# Patient Record
Sex: FEMALE | Race: WHITE | Employment: OTHER | URBAN - METROPOLITAN AREA
[De-identification: names, ages, dates, MRNs, and addresses within clinical notes are randomized per-mention and may not be internally consistent; named-entity substitution may affect disease eponyms.]

---

## 2017-05-02 ENCOUNTER — APPOINTMENT (OUTPATIENT)
Dept: GENERAL RADIOLOGY | Age: 82
DRG: 880 | End: 2017-05-02
Attending: INTERNAL MEDICINE
Payer: MEDICARE

## 2017-05-02 ENCOUNTER — HOSPITAL ENCOUNTER (INPATIENT)
Age: 82
LOS: 2 days | Discharge: HOME | DRG: 880 | End: 2017-05-04
Attending: INTERNAL MEDICINE | Admitting: INTERNAL MEDICINE
Payer: MEDICARE

## 2017-05-02 DIAGNOSIS — R32 URINARY INCONTINENCE: ICD-10-CM

## 2017-05-02 PROBLEM — R00.0 TACHYCARDIA: Status: ACTIVE | Noted: 2017-05-02

## 2017-05-02 LAB
ALBUMIN SERPL BCP-MCNC: 3.4 G/DL (ref 3.4–5)
ALBUMIN/GLOB SERPL: 1 {RATIO} (ref 0.8–1.7)
ALP SERPL-CCNC: 141 U/L (ref 45–117)
ALT SERPL-CCNC: 20 U/L (ref 13–56)
ANION GAP BLD CALC-SCNC: 9 MMOL/L (ref 3–18)
AST SERPL W P-5'-P-CCNC: 27 U/L (ref 15–37)
BASOPHILS # BLD AUTO: 0 K/UL (ref 0–0.06)
BASOPHILS # BLD: 1 % (ref 0–2)
BILIRUB SERPL-MCNC: 0.7 MG/DL (ref 0.2–1)
BUN SERPL-MCNC: 11 MG/DL (ref 7–18)
BUN/CREAT SERPL: 10 (ref 12–20)
CALCIUM SERPL-MCNC: 9 MG/DL (ref 8.5–10.1)
CHLORIDE SERPL-SCNC: 89 MMOL/L (ref 100–108)
CK MB CFR SERPL CALC: NORMAL % (ref 0–4)
CK MB SERPL-MCNC: <1 NG/ML (ref 5–25)
CK SERPL-CCNC: 102 U/L (ref 26–192)
CO2 SERPL-SCNC: 35 MMOL/L (ref 21–32)
CREAT SERPL-MCNC: 1.12 MG/DL (ref 0.6–1.3)
DIFFERENTIAL METHOD BLD: ABNORMAL
EOSINOPHIL # BLD: 0.2 K/UL (ref 0–0.4)
EOSINOPHIL NFR BLD: 2 % (ref 0–5)
ERYTHROCYTE [DISTWIDTH] IN BLOOD BY AUTOMATED COUNT: 13.6 % (ref 11.6–14.5)
GLOBULIN SER CALC-MCNC: 3.3 G/DL (ref 2–4)
GLUCOSE SERPL-MCNC: 144 MG/DL (ref 74–99)
HCT VFR BLD AUTO: 37.3 % (ref 35–45)
HGB BLD-MCNC: 12.6 G/DL (ref 12–16)
LYMPHOCYTES # BLD AUTO: 14 % (ref 21–52)
LYMPHOCYTES # BLD: 1.1 K/UL (ref 0.9–3.6)
MCH RBC QN AUTO: 28.9 PG (ref 24–34)
MCHC RBC AUTO-ENTMCNC: 33.8 G/DL (ref 31–37)
MCV RBC AUTO: 85.6 FL (ref 74–97)
MONOCYTES # BLD: 0.6 K/UL (ref 0.05–1.2)
MONOCYTES NFR BLD AUTO: 8 % (ref 3–10)
NEUTS SEG # BLD: 5.9 K/UL (ref 1.8–8)
NEUTS SEG NFR BLD AUTO: 75 % (ref 40–73)
PLATELET # BLD AUTO: 247 K/UL (ref 135–420)
PMV BLD AUTO: 8.1 FL (ref 9.2–11.8)
POTASSIUM SERPL-SCNC: 3.2 MMOL/L (ref 3.5–5.5)
PROT SERPL-MCNC: 6.7 G/DL (ref 6.4–8.2)
RBC # BLD AUTO: 4.36 M/UL (ref 4.2–5.3)
SODIUM SERPL-SCNC: 133 MMOL/L (ref 136–145)
TROPONIN I SERPL-MCNC: 0.02 NG/ML (ref 0–0.04)
TSH SERPL DL<=0.05 MIU/L-ACNC: 2.38 UIU/ML (ref 0.36–3.74)
WBC # BLD AUTO: 7.8 K/UL (ref 4.6–13.2)

## 2017-05-02 PROCEDURE — 84443 ASSAY THYROID STIM HORMONE: CPT | Performed by: INTERNAL MEDICINE

## 2017-05-02 PROCEDURE — 65270000029 HC RM PRIVATE

## 2017-05-02 PROCEDURE — 36415 COLL VENOUS BLD VENIPUNCTURE: CPT | Performed by: INTERNAL MEDICINE

## 2017-05-02 PROCEDURE — 74011250637 HC RX REV CODE- 250/637: Performed by: INTERNAL MEDICINE

## 2017-05-02 PROCEDURE — 82550 ASSAY OF CK (CPK): CPT | Performed by: INTERNAL MEDICINE

## 2017-05-02 PROCEDURE — 93005 ELECTROCARDIOGRAM TRACING: CPT

## 2017-05-02 PROCEDURE — 74011250636 HC RX REV CODE- 250/636: Performed by: INTERNAL MEDICINE

## 2017-05-02 PROCEDURE — 80053 COMPREHEN METABOLIC PANEL: CPT | Performed by: INTERNAL MEDICINE

## 2017-05-02 PROCEDURE — 71010 XR CHEST PORT: CPT

## 2017-05-02 PROCEDURE — 85025 COMPLETE CBC W/AUTO DIFF WBC: CPT | Performed by: INTERNAL MEDICINE

## 2017-05-02 PROCEDURE — 74011000258 HC RX REV CODE- 258: Performed by: INTERNAL MEDICINE

## 2017-05-02 RX ORDER — HEPARIN SODIUM 5000 [USP'U]/ML
5000 INJECTION, SOLUTION INTRAVENOUS; SUBCUTANEOUS EVERY 12 HOURS
Status: DISCONTINUED | OUTPATIENT
Start: 2017-05-02 | End: 2017-05-04 | Stop reason: HOSPADM

## 2017-05-02 RX ORDER — METOPROLOL SUCCINATE 50 MG/1
50 TABLET, EXTENDED RELEASE ORAL DAILY
Status: DISCONTINUED | OUTPATIENT
Start: 2017-05-02 | End: 2017-05-04 | Stop reason: HOSPADM

## 2017-05-02 RX ORDER — ERGOCALCIFEROL 1.25 MG/1
50000 CAPSULE ORAL
Status: DISCONTINUED | OUTPATIENT
Start: 2017-05-02 | End: 2017-05-04 | Stop reason: HOSPADM

## 2017-05-02 RX ORDER — PANTOPRAZOLE SODIUM 40 MG/1
40 TABLET, DELAYED RELEASE ORAL
Status: DISCONTINUED | OUTPATIENT
Start: 2017-05-03 | End: 2017-05-04 | Stop reason: HOSPADM

## 2017-05-02 RX ORDER — POTASSIUM CHLORIDE, DEXTROSE MONOHYDRATE AND SODIUM CHLORIDE 300; 5; 900 MG/100ML; G/100ML; MG/100ML
INJECTION, SOLUTION INTRAVENOUS CONTINUOUS
Status: DISCONTINUED | OUTPATIENT
Start: 2017-05-02 | End: 2017-05-04 | Stop reason: HOSPADM

## 2017-05-02 RX ORDER — ATORVASTATIN CALCIUM 40 MG/1
40 TABLET, FILM COATED ORAL DAILY
Status: DISCONTINUED | OUTPATIENT
Start: 2017-05-03 | End: 2017-05-02

## 2017-05-02 RX ORDER — MIRTAZAPINE 15 MG/1
30 TABLET, FILM COATED ORAL
Status: DISCONTINUED | OUTPATIENT
Start: 2017-05-02 | End: 2017-05-04 | Stop reason: HOSPADM

## 2017-05-02 RX ORDER — LORAZEPAM 0.5 MG/1
0.5 TABLET ORAL 2 TIMES DAILY
Status: DISCONTINUED | OUTPATIENT
Start: 2017-05-02 | End: 2017-05-04 | Stop reason: HOSPADM

## 2017-05-02 RX ORDER — LEVOTHYROXINE SODIUM 100 UG/1
100 TABLET ORAL
Status: DISCONTINUED | OUTPATIENT
Start: 2017-05-03 | End: 2017-05-04 | Stop reason: HOSPADM

## 2017-05-02 RX ORDER — AMLODIPINE BESYLATE 5 MG/1
5 TABLET ORAL DAILY
COMMUNITY

## 2017-05-02 RX ORDER — ATORVASTATIN CALCIUM 20 MG/1
20 TABLET, FILM COATED ORAL DAILY
Status: DISCONTINUED | OUTPATIENT
Start: 2017-05-03 | End: 2017-05-04 | Stop reason: HOSPADM

## 2017-05-02 RX ORDER — LANOLIN ALCOHOL/MO/W.PET/CERES
1000 CREAM (GRAM) TOPICAL DAILY
Status: DISCONTINUED | OUTPATIENT
Start: 2017-05-03 | End: 2017-05-04 | Stop reason: HOSPADM

## 2017-05-02 RX ORDER — GUAIFENESIN 100 MG/5ML
81 LIQUID (ML) ORAL DAILY
Status: DISCONTINUED | OUTPATIENT
Start: 2017-05-03 | End: 2017-05-04 | Stop reason: HOSPADM

## 2017-05-02 RX ORDER — TRAMADOL HYDROCHLORIDE 50 MG/1
50 TABLET ORAL
Status: DISCONTINUED | OUTPATIENT
Start: 2017-05-02 | End: 2017-05-04 | Stop reason: HOSPADM

## 2017-05-02 RX ORDER — MONTELUKAST SODIUM 10 MG/1
10 TABLET ORAL
Status: DISCONTINUED | OUTPATIENT
Start: 2017-05-02 | End: 2017-05-04 | Stop reason: HOSPADM

## 2017-05-02 RX ORDER — DEXAMETHASONE SODIUM PHOSPHATE 4 MG/ML
4 INJECTION, SOLUTION INTRA-ARTICULAR; INTRALESIONAL; INTRAMUSCULAR; INTRAVENOUS; SOFT TISSUE EVERY 8 HOURS
Status: DISCONTINUED | OUTPATIENT
Start: 2017-05-02 | End: 2017-05-04 | Stop reason: HOSPADM

## 2017-05-02 RX ORDER — AMLODIPINE BESYLATE 5 MG/1
5 TABLET ORAL DAILY
Status: DISCONTINUED | OUTPATIENT
Start: 2017-05-03 | End: 2017-05-04 | Stop reason: HOSPADM

## 2017-05-02 RX ORDER — FERROUS SULFATE, DRIED 160(50) MG
1 TABLET, EXTENDED RELEASE ORAL
Status: DISCONTINUED | OUTPATIENT
Start: 2017-05-03 | End: 2017-05-04 | Stop reason: HOSPADM

## 2017-05-02 RX ADMIN — DEXAMETHASONE SODIUM PHOSPHATE 4 MG: 4 INJECTION, SOLUTION INTRAMUSCULAR; INTRAVENOUS at 13:24

## 2017-05-02 RX ADMIN — HEPARIN SODIUM 5000 UNITS: 5000 INJECTION, SOLUTION INTRAVENOUS; SUBCUTANEOUS at 13:24

## 2017-05-02 RX ADMIN — DEXAMETHASONE SODIUM PHOSPHATE 4 MG: 4 INJECTION, SOLUTION INTRAMUSCULAR; INTRAVENOUS at 22:07

## 2017-05-02 RX ADMIN — CEFAZOLIN SODIUM 1 G: 1 INJECTION, POWDER, FOR SOLUTION INTRAMUSCULAR; INTRAVENOUS at 13:25

## 2017-05-02 RX ADMIN — MONTELUKAST SODIUM 10 MG: 10 TABLET, FILM COATED ORAL at 22:07

## 2017-05-02 RX ADMIN — ERGOCALCIFEROL 50000 UNITS: 1.25 CAPSULE ORAL at 13:24

## 2017-05-02 RX ADMIN — TRAMADOL HYDROCHLORIDE 50 MG: 50 TABLET, FILM COATED ORAL at 13:24

## 2017-05-02 RX ADMIN — MIRTAZAPINE 30 MG: 15 TABLET, FILM COATED ORAL at 22:07

## 2017-05-02 RX ADMIN — TRAMADOL HYDROCHLORIDE 50 MG: 50 TABLET, FILM COATED ORAL at 20:06

## 2017-05-02 RX ADMIN — DEXTROSE MONOHYDRATE, SODIUM CHLORIDE, AND POTASSIUM CHLORIDE: 50; 9; 2.98 INJECTION, SOLUTION INTRAVENOUS at 20:05

## 2017-05-02 RX ADMIN — METOPROLOL SUCCINATE 50 MG: 50 TABLET, EXTENDED RELEASE ORAL at 13:24

## 2017-05-02 NOTE — IP AVS SNAPSHOT
Current Discharge Medication List  
  
START taking these medications Dose & Instructions Dispensing Information Comments Morning Noon Evening Bedtime  
 dexamethasone 2 mg tablet Commonly known as:  DECADRON Your last dose was: Your next dose is:    
   
   
 Dose:  2 mg Take 1 Tab by mouth two (2) times daily (with meals) for 3 days. Quantity:  6 Tab Refills:  0  
     
   
   
   
  
 lidocaine 5 % Commonly known as:  Curt Hitch Your last dose was: Your next dose is:    
   
   
 Apply patch to the affected area for 12 hours a day and remove for 12 hours a day. Quantity:  30 Each Refills:  2  
     
   
   
   
  
 metoprolol succinate 50 mg XL tablet Commonly known as:  TOPROL-XL Your last dose was: Your next dose is:    
   
   
 Dose:  50 mg Take 1 Tab by mouth daily. Quantity:  30 Tab Refills:  0  
     
   
   
   
  
 mirtazapine 30 mg tablet Commonly known as:  Orlean Cancel Your last dose was: Your next dose is:    
   
   
 Dose:  30 mg Take 1 Tab by mouth nightly. Quantity:  30 Tab Refills:  0  
     
   
   
   
  
 traMADol 50 mg tablet Commonly known as:  ULTRAM  
   
Your last dose was: Your next dose is:    
   
   
 Dose:  50 mg Take 1 Tab by mouth two (2) times daily as needed. Max Daily Amount: 100 mg. Quantity:  20 Tab Refills:  0 CONTINUE these medications which have CHANGED Dose & Instructions Dispensing Information Comments Morning Noon Evening Bedtime  
 cephALEXin 250 mg capsule Commonly known as:  Mark Maria What changed:  when to take this Your last dose was: Your next dose is:    
   
   
 Dose:  500 mg Take 2 Caps by mouth three (3) times daily. Quantity:  21 Cap Refills:  0 CONTINUE these medications which have NOT CHANGED Dose & Instructions Dispensing Information Comments Morning Noon Evening Bedtime  
 aspirin 81 mg tablet Your last dose was: Your next dose is:    
   
   
 Dose:  81 mg Take 81 mg by mouth. Refills:  0  
     
   
   
   
  
 atorvastatin 20 mg tablet Commonly known as:  LIPITOR Your last dose was: Your next dose is: Take  by mouth daily. Refills:  0  
     
   
   
   
  
 BONIVA 150 mg tablet Generic drug:  ibandronate Your last dose was: Your next dose is:    
   
   
 Dose:  150 mg Take 150 mg by mouth every thirty (30) days. Refills:  0  
     
   
   
   
  
 CALTRATE 600+D PLUS MINERALS 600 mg calcium- 400 unit Tab Generic drug:  Calcium Carbonate-Vit D3-Min Your last dose was: Your next dose is: Take  by mouth. Refills:  0  
     
   
   
   
  
 citalopram 10 mg tablet Commonly known as:  Cherylynn Divine Your last dose was: Your next dose is:    
   
   
 Dose:  10 mg Take 10 mg by mouth daily. Refills:  0  
     
   
   
   
  
 cyanocobalamin 100 mcg tablet Commonly known as:  VITAMIN B12 Your last dose was: Your next dose is:    
   
   
 Dose:  2500 mcg Take 2,500 mcg by mouth daily. Refills:  0  
     
   
   
   
  
 ergocalciferol 50,000 unit capsule Commonly known as:  ERGOCALCIFEROL Your last dose was: Your next dose is:    
   
   
 Dose:  85835 Units Take 50,000 Units by mouth every seven (7) days. Refills:  0  
     
   
   
   
  
 levothyroxine 100 mcg tablet Commonly known as:  SYNTHROID Your last dose was: Your next dose is: Take  by mouth Daily (before breakfast). Refills:  0  
     
   
   
   
  
 montelukast 10 mg tablet Commonly known as:  SINGULAIR Your last dose was: Your next dose is:    
   
   
 Dose:  10 mg Take 10 mg by mouth daily. Refills:  0 NexIUM 40 mg capsule Generic drug:  esomeprazole Your last dose was: Your next dose is:    
   
   
 Dose:  40 mg Take 40 mg by mouth daily. Refills:  0 NORVASC 5 mg tablet Generic drug:  amLODIPine Your last dose was: Your next dose is:    
   
   
 Dose:  5 mg Take 5 mg by mouth daily. Indications: hypertension Refills:  0 STOP taking these medications   
 estradiol 0.01 % (0.1 mg/gram) vaginal cream  
Commonly known as:  ESTRACE  
   
  
 losartan-hydroCHLOROthiazide 50-12.5 mg per tablet Commonly known as:  HYZAAR  
   
  
 metoprolol tartrate 25 mg tablet Commonly known as:  LOPRESSOR Where to Get Your Medications Information on where to get these meds will be given to you by the nurse or doctor. ! Ask your nurse or doctor about these medications  
  cephALEXin 250 mg capsule  
 dexamethasone 2 mg tablet  
 lidocaine 5 %  
 metoprolol succinate 50 mg XL tablet  
 mirtazapine 30 mg tablet  
 traMADol 50 mg tablet

## 2017-05-02 NOTE — IP AVS SNAPSHOT
Mahendra Eldridge 
 
 
 [address] [place] [address] 
[phone] Patient: Julianne Anna MRN: [id number] :10/14/1926 You are allergic to the following Allergen Reactions Olegario-1 Other (comments)  
 novacaine- makes me go crazy in the head Recent Documentation Height Weight Breastfeeding? BMI OB Status Smoking Status 1.575 m 74.4 kg No 30.01 kg/m2 Hysterectomy Never Smoker Emergency Contacts Name Discharge Info Relation Home Work Mobile Rodriguez Boogie DISCHARGE CAREGIVER [3] Spouse [3] [phone] Soledad Nichole DISCHARGE CAREGIVER [3] Daughter [21] [phone] About your hospitalization You were admitted on:  May 2, 2017 You last received care in the:  [facility] You were discharged on: May 4, 2017 Unit phone number:  [phone] Why you were hospitalized Your primary diagnosis was:  Not on File Your diagnoses also included: Tachycardia, Dyspnea, Hypokalemia, Spinal Stenosis Of Lumbar Region, Right Arm Cellulitis, Lymphedema Of Right Arm, Pac (Premature Atrial Contraction) Providers Seen During Your Hospitalizations Provider Role Specialty Primary office phone Miranda Taveras MD Attending Provider Geriatric Medicine [phone] Your Primary Care Physician (PCP) Primary Care Physician Office Phone Office Fax Imelda Seymour [phone] Follow-up Information Follow up With Details Comments Contact Info [place]   [id number] [address] Internal Medicine [facility] [phone] Current Discharge Medication List  
  
START taking these medications Dose & Instructions Dispensing Information Comments Morning Noon Evening Bedtime  
 dexamethasone 2 mg tablet Commonly known as:  DECADRON Your last dose was: Your next dose is:    
   
   
 Dose:  2 mg Take 1 Tab by mouth two (2) times daily (with meals) for 3 days. Quantity:  6 Tab Refills:  0  
     
   
   
   
  
 lidocaine 5 % Commonly known as:  Lukas Barclay Your last dose was: Your next dose is:    
   
   
 Apply patch to the affected area for 12 hours a day and remove for 12 hours a day. Quantity:  30 Each Refills:  2  
     
   
   
   
  
 metoprolol succinate 50 mg XL tablet Commonly known as:  TOPROL-XL Your last dose was: Your next dose is:    
   
   
 Dose:  50 mg Take 1 Tab by mouth daily. Quantity:  30 Tab Refills:  0  
     
   
   
   
  
 mirtazapine 30 mg tablet Commonly known as:  Lurahul Campos Your last dose was: Your next dose is:    
   
   
 Dose:  30 mg Take 1 Tab by mouth nightly. Quantity:  30 Tab Refills:  0  
     
   
   
   
  
 traMADol 50 mg tablet Commonly known as:  ULTRAM  
   
Your last dose was: Your next dose is:    
   
   
 Dose:  50 mg Take 1 Tab by mouth two (2) times daily as needed. Max Daily Amount: 100 mg. Quantity:  20 Tab Refills:  0 CONTINUE these medications which have CHANGED Dose & Instructions Dispensing Information Comments Morning Noon Evening Bedtime  
 cephALEXin 250 mg capsule Commonly known as:  Christina Pluck What changed:  when to take this Your last dose was: Your next dose is:    
   
   
 Dose:  500 mg Take 2 Caps by mouth three (3) times daily. Quantity:  21 Cap Refills:  0 CONTINUE these medications which have NOT CHANGED Dose & Instructions Dispensing Information Comments Morning Noon Evening Bedtime  
 aspirin 81 mg tablet Your last dose was: Your next dose is:    
   
   
 Dose:  81 mg Take 81 mg by mouth. Refills:  0  
     
   
   
   
  
 atorvastatin 20 mg tablet Commonly known as:  LIPITOR Your last dose was: Your next dose is: Take  by mouth daily. Refills:  0  
     
   
   
   
  
 BONIVA 150 mg tablet Generic drug:  ibandronate Your last dose was: Your next dose is:    
   
   
 Dose:  150 mg Take 150 mg by mouth every thirty (30) days. Refills:  0  
     
   
   
   
  
 CALTRATE 600+D PLUS MINERALS 600 mg calcium- 400 unit Tab Generic drug:  Calcium Carbonate-Vit D3-Min Your last dose was: Your next dose is: Take  by mouth. Refills:  0  
     
   
   
   
  
 citalopram 10 mg tablet Commonly known as:  Alisa Jones Your last dose was: Your next dose is:    
   
   
 Dose:  10 mg Take 10 mg by mouth daily. Refills:  0  
     
   
   
   
  
 cyanocobalamin 100 mcg tablet Commonly known as:  VITAMIN B12 Your last dose was: Your next dose is:    
   
   
 Dose:  2500 mcg Take 2,500 mcg by mouth daily. Refills:  0  
     
   
   
   
  
 ergocalciferol 50,000 unit capsule Commonly known as:  ERGOCALCIFEROL Your last dose was: Your next dose is:    
   
   
 Dose:  47888 Units Take 50,000 Units by mouth every seven (7) days. Refills:  0  
     
   
   
   
  
 levothyroxine 100 mcg tablet Commonly known as:  SYNTHROID Your last dose was: Your next dose is: Take  by mouth Daily (before breakfast). Refills:  0  
     
   
   
   
  
 montelukast 10 mg tablet Commonly known as:  SINGULAIR Your last dose was: Your next dose is:    
   
   
 Dose:  10 mg Take 10 mg by mouth daily. Refills:  0 NexIUM 40 mg capsule Generic drug:  esomeprazole Your last dose was: Your next dose is:    
   
   
 Dose:  40 mg Take 40 mg by mouth daily. Refills:  0 NORVASC 5 mg tablet Generic drug:  amLODIPine Your last dose was: Your next dose is:    
   
   
 Dose:  5 mg Take 5 mg by mouth daily. Indications: hypertension Refills:  0 STOP taking these medications   
 estradiol 0.01 % (0.1 mg/gram) vaginal cream  
Commonly known as:  ESTRACE  
   
  
 losartan-hydroCHLOROthiazide 50-12.5 mg per tablet Commonly known as:  HYZAAR  
   
  
 metoprolol tartrate 25 mg tablet Commonly known as:  LOPRESSOR Where to Get Your Medications Information on where to get these meds will be given to you by the nurse or doctor. ! Ask your nurse or doctor about these medications  
  cephALEXin 250 mg capsule  
 dexamethasone 2 mg tablet  
 lidocaine 5 %  
 metoprolol succinate 50 mg XL tablet  
 mirtazapine 30 mg tablet  
 traMADol 50 mg tablet Discharge Instructions DISCHARGE SUMMARY from Nurse The following personal items are in your possession at time of discharge: 
 
Dental Appliances: None Hearing Aids/Status: Bilateral, With patient Home Medications: None Jewelry: Durene Docker Clothing: At bedside, Footwear, Pants, Shirt, Undergarments, Other (comment) (breast implants (2) inside bra) Other Valuables: [address], [address] home PATIENT INSTRUCTIONS: 
 
 
F-face looks uneven A-arms unable to move or move unevenly S-speech slurred or non-existent T-time-call 911 as soon as signs and symptoms begin-DO NOT go Back to bed or wait to see if you get better-TIME IS BRAIN. Warning Signs of HEART ATTACK Call 911 if you have these symptoms: 
? Chest discomfort. Most heart attacks involve discomfort in the center of the chest that lasts more than a few minutes, or that goes away and comes back. It can feel like uncomfortable pressure, squeezing, fullness, or pain. ? Discomfort in other areas of the upper body. Symptoms can include pain or discomfort in one or both arms, the back, neck, jaw, or stomach. ? Shortness of breath with or without chest discomfort. ? Other signs may include breaking out in a cold sweat, nausea, or lightheadedness. Don't wait more than five minutes to call [address]! Fast action can save your life. Calling 911 is almost always the fastest way to get lifesaving treatment. Emergency Medical Services staff can begin treatment when they arrive  up to an hour sooner than if someone gets to the hospital by car. The discharge information has been reviewed with the patient. The patient verbalized understanding. Discharge medications reviewed with the patient and appropriate educational materials and side effects teaching were provided. Discharge Orders None [employer] Announcement We are excited to announce that we are making your provider's discharge notes available to you in Kirke[employer]. You will see these notes when they are completed and signed by the physician that discharged you from your recent hospital stay. If you have any questions or concerns about any information you see in KirkeWeb, please call the Health Information Department where you were seen or reach out to your Primary Care Provider for more information about your plan of care. Introducing [facility]! Vinita Rajan introduces KirkeWeb patient portal. Now you can access parts of your medical record, email your doctor's office, and request medication refills online. 1. In your internet browser, go to [email or web address] 2. Click on the First Time User? Click Here link in the Sign In box. You will see the New Member Sign Up page. 3. Enter your KirkeWeb Access Code exactly as it appears below. You will not need to use this code after youve completed the sign-up process.  If you do not sign up before the expiration date, you must request a new code. · Down[employer]me Access Code: [email or web address] Expires: 8/1/2017  9:03 AM 
 
4. Enter the last four digits of your Social Security Number (xxxx) and Date of Birth (mm/dd/yyyy) as indicated and click Submit. You will be taken to the next sign-up page. 5. Create a Downtyme ID. This will be your Downtyme login ID and cannot be changed, so think of one that is secure and easy to remember. 6. Create a Downtyme password. You can change your password at any time. 7. Enter your Password Reset Question and Answer. This can be used at a later time if you forget your password. 8. Enter your e-mail address. You will receive e-mail notification when new information is available in [address] 9. Click Sign Up. You can now view and download portions of your medical record. 10. Click the Download Summary menu link to download a portable copy of your medical information. If you have questions, please visit the Frequently Asked Questions section of the Downtyme website. Remember, Downtyme is NOT to be used for urgent needs. For medical emergencies, dial 911. Now available from your iPhone and Android! General Information Please provide this summary of care documentation to your next provider. Patient Signature:  ____________________________________________________________ Date:  ____________________________________________________________  
  
[facility] Provider Signature:  ____________________________________________________________ Date:  ____________________________________________________________

## 2017-05-02 NOTE — ROUTINE PROCESS
1620-Assessment completed, call bell within reach, no distress noted, voiced no complaints at this time, daughter at bedside. 1803-pt resting quietly, refusing scheduled ativan.  1925-Bedside and Verbal shift change report given to Anna GROSSMAN (oncoming nurse) by Nayeli Estes (offgoing nurse). Report included the following information SBAR, MAR and Recent Results.

## 2017-05-02 NOTE — PROGRESS NOTES
conducted an initial consultation and Spiritual Assessment for Lissette Horne, who is a 80 y.o.,female. Patients Primary Language is: [place]. According to the patients EMR Anabaptism Affiliation is: Kwame Klein.     The reason the Patient came to the hospital is:   Patient Active Problem List    Diagnosis Date Noted    Tachycardia 05/02/2017    Urinary incontinence 11/13/2014    Urge incontinence 11/13/2014        The  provided the following Interventions:  Initiated a relationship of care and support. Explored issues of ramírez, belief, spirituality and Yazidi/ritual needs while hospitalized. Listened empathically. Provided chaplaincy education. Provided information about Spiritual Care Services. Offered prayer and assurance of continued prayers on patient's behalf. Chart reviewed. The following outcomes were achieved:  Patient shared limited information about both their medical narrative and spiritual journey/beliefs. Patient processed feeling about current hospitalization. Patient expressed gratitude for the 's visit. Assessment:  Patient does not have any Yazidi/cultural needs that will affect patients preferences in health care. Patient did not indicate any spiritual or Yazidi issues which require Spiritual Care Services interventions at this time. Plan:  Chaplains will continue to follow and will provide pastoral care on an as needed/requested basis.  recommends bedside caregivers page  on duty if patient shows signs of acute spiritual or emotional distress.     [address] [facility]   Staff [facility]   [phone]

## 2017-05-02 NOTE — H&P
[address]    Name:  Mart Severin  MR#:  [id number]  :  10/14/1926  Account #:  [de-identified]  Date of Adm:  2017      CHIEF COMPLAINT: Dyspnea and tachycardia and presyncope. HISTORY OF PRESENT ILLNESS: The patient is a 80-year-old white  female with multiple medical problems, who is being seen in the office  for followup and complaints of worsening low back and left lower  extremity pain. The patient is known to have spinal stenosis for which  epidural injections did not help and patient was not felt to be a good  candidate for surgery. During examination, the patient was noted to be tachycardic with  irregular heart beat. The patient experienced dyspnea during the exam  and her pulse oximetry was in the low 80s for less than a minute. The  patient was put in bed on the exam table and her oxygen saturation as  well as her heart rate improved. At the time, her EKG in the office  showed sinus rhythm with PACs. She denied chest pain or new focal  neurologic symptoms. The patient had a brief presyncopal episode. Her blood pressure remained stable. The patient was felt to need admission for observation and further  management. PAST MEDICAL HISTORY  1. Hypertension. 2.  Hypercholesterolemia. 3.  History of coronary artery disease in the past.  4.  Orthostatic hypotension, currently stable. 5.  Hypothyroidism. 6.  B12 deficiency. 7.  History of iron-deficiency anemia in the past.  8.  History of bronchial asthma. 9.  Gastroesophageal reflux disease. 10. Osteoporosis. 11. Spinal stenosis, as above. 12. Osteoarthritis. 13. The patient also had breast cancer for which she underwent  mastectomy with persistent right upper extremity lymphedema. PAST SURGICAL HISTORY: Right mastectomy as above,  hysterectomy, knee replacement, tonsillectomy, and adenoidectomy. ALLERGIES: SHE IS ALLERGIC TO MORPHINE. CURRENT MEDICATIONS  1.   Aspirin 81 mg daily.  2.  Singulair 10 mg daily. 3.  B12 1000 mcg daily. 4.  Synthroid 100 mcg daily. 5.  Toprol-XL 25 mg daily. 6.  Nexium 40 mg every other day. 7.  Celexa 20 mg daily. 8.  Lipitor 20 mg daily. 9.  Ferrous sulfate 325 mg daily. 10. Remeron 30 mg at bedtime. 11. Ativan 0.5 mg twice daily as needed. SOCIAL HISTORY: The patient is . Her  has dementia. She has 4 children. She does not smoke or drink at this time. FAMILY HISTORY: Positive for coronary artery disease in her parents. REVIEW OF SYSTEMS: The patient, in addition to the above  symptoms, had some slight increase in the swelling and redness of her  right upper extremity. She denied chest pain, no abdominal pain,  nausea, vomiting, diarrhea. No dysuria, polyuria, hematuria. No focal  neurologic symptoms. The rest is as per history of the present illness. PHYSICAL EXAMINATION  GENERAL: Pleasant female in no acute distress, weak and semi-  responsive. during the episode, which lasted a few minutes. HEENT: Atraumatic, normocephalic. Sclerae anicteric. NECK: No adenopathy or thyromegaly. LUNGS: Clear to auscultation. HEART: Irregular, at times and rate up to 121 during exam.  ABDOMEN: Soft, nontender, nondistended. EXTREMITIES: Lower extremity was 1+ edema of the ankle. Right  upper extremity with significant lymphedema and medial cellulitis of the  elbow and forearm. NEUROLOGIC: Nonfocal.    LABORATORIES PENDING: EKG in the office, sinus rhythm with  frequent PACs. IMPRESSION  1. Transient episode of dyspnea, tachycardia, and presyncope,  questionable arrhythmia. 2. Spinal stenosis with sciatica - worse than baseline. 3. Right upper extremity cellulitis and lymphedema. 4. Hypertension. 5. Hypothyroidism. 6. As per past medical history. PLAN: The patient will be admitted to telemetry. Increase Toprol to 50  mg at this time. Will obtain chest x-ray, EKG, baseline labs including  TSH. Resume all her medications. IV Ancef for cellulitis with elevation  of the right arm. IV Decadron for her spinal stenosis. DVT prophylaxis  with subcutaneous heparin. Further management accordingly.         Tiara Kraus MD    NT / TB  D:  05/02/2017   12:41  T:  05/02/2017   13:20  Job #:  [id number]

## 2017-05-02 NOTE — PROGRESS NOTES

## 2017-05-03 PROBLEM — I49.1 PAC (PREMATURE ATRIAL CONTRACTION): Status: ACTIVE | Noted: 2017-05-03

## 2017-05-03 PROBLEM — R06.00 DYSPNEA: Status: ACTIVE | Noted: 2017-05-03

## 2017-05-03 PROBLEM — E87.6 HYPOKALEMIA: Status: ACTIVE | Noted: 2017-05-03

## 2017-05-03 PROBLEM — L03.113 RIGHT ARM CELLULITIS: Status: ACTIVE | Noted: 2017-05-03

## 2017-05-03 PROBLEM — M48.061 SPINAL STENOSIS OF LUMBAR REGION: Status: ACTIVE | Noted: 2017-05-03

## 2017-05-03 PROBLEM — I89.0 LYMPHEDEMA OF RIGHT ARM: Status: ACTIVE | Noted: 2017-05-03

## 2017-05-03 LAB
ANION GAP BLD CALC-SCNC: 10 MMOL/L (ref 3–18)
ATRIAL RATE: 111 BPM
BUN SERPL-MCNC: 12 MG/DL (ref 7–18)
BUN/CREAT SERPL: 11 (ref 12–20)
CALCIUM SERPL-MCNC: 8.6 MG/DL (ref 8.5–10.1)
CALCULATED P AXIS, ECG09: 45 DEGREES
CALCULATED R AXIS, ECG10: 0 DEGREES
CALCULATED T AXIS, ECG11: -133 DEGREES
CHLORIDE SERPL-SCNC: 93 MMOL/L (ref 100–108)
CK MB CFR SERPL CALC: 1.4 % (ref 0–4)
CK MB SERPL-MCNC: 1.4 NG/ML (ref 5–25)
CK SERPL-CCNC: 99 U/L (ref 26–192)
CO2 SERPL-SCNC: 32 MMOL/L (ref 21–32)
CREAT SERPL-MCNC: 1.07 MG/DL (ref 0.6–1.3)
DIAGNOSIS, 93000: NORMAL
GLUCOSE SERPL-MCNC: 157 MG/DL (ref 74–99)
P-R INTERVAL, ECG05: 272 MS
POTASSIUM SERPL-SCNC: 3.3 MMOL/L (ref 3.5–5.5)
Q-T INTERVAL, ECG07: 258 MS
QRS DURATION, ECG06: 84 MS
QTC CALCULATION (BEZET), ECG08: 350 MS
SODIUM SERPL-SCNC: 135 MMOL/L (ref 136–145)
TROPONIN I SERPL-MCNC: 0.02 NG/ML (ref 0–0.04)
VENTRICULAR RATE, ECG03: 111 BPM

## 2017-05-03 PROCEDURE — 97162 PT EVAL MOD COMPLEX 30 MIN: CPT

## 2017-05-03 PROCEDURE — 74011250637 HC RX REV CODE- 250/637: Performed by: INTERNAL MEDICINE

## 2017-05-03 PROCEDURE — 80048 BASIC METABOLIC PNL TOTAL CA: CPT | Performed by: INTERNAL MEDICINE

## 2017-05-03 PROCEDURE — 65270000029 HC RM PRIVATE

## 2017-05-03 PROCEDURE — 74011000258 HC RX REV CODE- 258: Performed by: INTERNAL MEDICINE

## 2017-05-03 PROCEDURE — 74011250636 HC RX REV CODE- 250/636: Performed by: INTERNAL MEDICINE

## 2017-05-03 PROCEDURE — 82550 ASSAY OF CK (CPK): CPT | Performed by: INTERNAL MEDICINE

## 2017-05-03 PROCEDURE — 36415 COLL VENOUS BLD VENIPUNCTURE: CPT | Performed by: INTERNAL MEDICINE

## 2017-05-03 RX ORDER — LIDOCAINE 50 MG/G
1 PATCH TOPICAL EVERY 24 HOURS
Status: DISCONTINUED | OUTPATIENT
Start: 2017-05-03 | End: 2017-05-04 | Stop reason: HOSPADM

## 2017-05-03 RX ADMIN — PANTOPRAZOLE SODIUM 40 MG: 40 TABLET, DELAYED RELEASE ORAL at 08:20

## 2017-05-03 RX ADMIN — CYANOCOBALAMIN TAB 1000 MCG 1000 MCG: 1000 TAB at 08:20

## 2017-05-03 RX ADMIN — TRAMADOL HYDROCHLORIDE 50 MG: 50 TABLET, FILM COATED ORAL at 08:20

## 2017-05-03 RX ADMIN — CEFAZOLIN SODIUM 1 G: 1 INJECTION, POWDER, FOR SOLUTION INTRAMUSCULAR; INTRAVENOUS at 12:15

## 2017-05-03 RX ADMIN — DEXAMETHASONE SODIUM PHOSPHATE 4 MG: 4 INJECTION, SOLUTION INTRAMUSCULAR; INTRAVENOUS at 22:14

## 2017-05-03 RX ADMIN — CALCIUM CARBONATE 500 MG (1,250 MG)-VITAMIN D3 200 UNIT TABLET 1 TABLET: at 08:20

## 2017-05-03 RX ADMIN — ATORVASTATIN CALCIUM 20 MG: 20 TABLET, FILM COATED ORAL at 08:20

## 2017-05-03 RX ADMIN — AMLODIPINE BESYLATE 5 MG: 5 TABLET ORAL at 08:20

## 2017-05-03 RX ADMIN — LEVOTHYROXINE SODIUM 100 MCG: 100 TABLET ORAL at 08:24

## 2017-05-03 RX ADMIN — DEXTROSE MONOHYDRATE, SODIUM CHLORIDE, AND POTASSIUM CHLORIDE: 50; 9; 2.98 INJECTION, SOLUTION INTRAVENOUS at 11:22

## 2017-05-03 RX ADMIN — ASPIRIN 81 MG CHEWABLE TABLET 81 MG: 81 TABLET CHEWABLE at 08:20

## 2017-05-03 RX ADMIN — MONTELUKAST SODIUM 10 MG: 10 TABLET, FILM COATED ORAL at 22:14

## 2017-05-03 RX ADMIN — HEPARIN SODIUM 5000 UNITS: 5000 INJECTION, SOLUTION INTRAVENOUS; SUBCUTANEOUS at 01:24

## 2017-05-03 RX ADMIN — TRAMADOL HYDROCHLORIDE 50 MG: 50 TABLET, FILM COATED ORAL at 22:15

## 2017-05-03 RX ADMIN — METOPROLOL SUCCINATE 50 MG: 50 TABLET, EXTENDED RELEASE ORAL at 08:20

## 2017-05-03 RX ADMIN — DEXAMETHASONE SODIUM PHOSPHATE 4 MG: 4 INJECTION, SOLUTION INTRAMUSCULAR; INTRAVENOUS at 06:18

## 2017-05-03 RX ADMIN — MIRTAZAPINE 30 MG: 15 TABLET, FILM COATED ORAL at 22:15

## 2017-05-03 RX ADMIN — TRAMADOL HYDROCHLORIDE 50 MG: 50 TABLET, FILM COATED ORAL at 14:02

## 2017-05-03 RX ADMIN — CEFAZOLIN SODIUM 1 G: 1 INJECTION, POWDER, FOR SOLUTION INTRAMUSCULAR; INTRAVENOUS at 01:23

## 2017-05-03 RX ADMIN — DEXAMETHASONE SODIUM PHOSPHATE 4 MG: 4 INJECTION, SOLUTION INTRAMUSCULAR; INTRAVENOUS at 13:52

## 2017-05-03 RX ADMIN — HEPARIN SODIUM 5000 UNITS: 5000 INJECTION, SOLUTION INTRAVENOUS; SUBCUTANEOUS at 12:15

## 2017-05-03 NOTE — PROGRESS NOTES
Problem: Mobility Impaired (Adult and Pediatric)  Goal: *Acute Goals and Plan of Care (Insert Text)  Physical Therapy Goals  Initiated 5/3/2017 and to be accomplished within 7 day(s)  1. Patient will move from supine to sit and sit to supine in bed with modified independence. 2. Patient will transfer from bed to chair and chair to bed with modified independence using the least restrictive device. 3. Patient will perform sit to stand with modified independence. 4. Patient will ambulate with modified independence for 150 feet with the least restrictive device. 5. Patient will ascend/descend 3 stairs with one handrail(s) with supervision/set-up. Outcome: Progressing Towards Goal  PHYSICAL THERAPY EVALUATION     Patient: Tawny November (45 y.o. female)  Date: 5/3/2017  Primary Diagnosis: Tachycardia  Near Syncope   Dyspnea  Tachycardia        Precautions: Fall      PROBLEM LIST:  Patient presents with the following problems:   Bed Mobility, Transfers, Gait and Stairs  ASSESSMENT :   Patient requires between moderate assistance  and minimal assistance/contact guard assist for bed mobility, transfers and ambulation. Vitals: /65; HR 85, O2 saturation 100%. Min A for supine to sit in bed; mod A for return to bed, sit to supine, for lower extremity management. Amb with standard wlaker min A 75 feet. Prior to admission, amb with standard walker and independent in ADLs. Discharge rec to home. Patient will benefit from skilled intervention to address the above impairments.   Patients rehabilitation potential is considered to be Fair  Factors which may influence rehabilitation potential include:   [ ]         None noted  [ ]         Mental ability/status  [X]         Medical condition  [ ]         Home/family situation and support systems  [ ]         Safety awareness  [X]         Pain tolerance/management  [ ]         Other:        PLAN :  Recommendations and Planned Interventions:  [X]           Bed Mobility Training             [X]    Neuromuscular Re-Education  [X]           Transfer Training                   [ ]    Orthotic/Prosthetic Training  [X]           Gait Training                          [ ]    Modalities  [X]           Therapeutic Exercises          [ ]    Edema Management/Control  [X]           Therapeutic Activities            [X]    Patient and Family Training/Education  [ ]           Other (comment):     Frequency/Duration: Patient will be followed by physical therapy 3-5 times a week to address goals. Discharge Recommendations: Home Health  Further Equipment Recommendations for Discharge: N/A       SUBJECTIVE:   Patient stated My back hurts.       OBJECTIVE DATA SUMMARY:       Past Medical History:   Diagnosis Date    Acid reflux      Acute MI ([facility] [address]       1996    Arthritis      Asthma      Cancer ([facility] [address]       breast    Hypertension      Hypothyroidism       Past Surgical History:   Procedure Laterality Date    HX HERNIA REPAIR        HX HYSTERECTOMY        HX KNEE REPLACEMENT         bilateral    HX LYMPHADENECTOMY         right arm    HX MASTECTOMY         bilateral    HX OTHER SURGICAL         lacrimal duct plugs    HX TONSIL AND ADENOIDECTOMY         Barriers to Learning/Limitations: None  Compensate with: visual, verbal, tactile, kinesthetic cues/model     G CODES:Mobility  Current  CL= 60-79%   Goal  CK= 40-59%. The severity rating is based on the Other [employer] Balance Scale 2/5     [facility] Standing Balance Scale 2/5  0: Pt performs 25% or less of standing activity (Max assist) CN, 100% impaired. 1: Pt supports self with upper extremities but requires therapist assistance. Pt performs 25-50% of effort (Mod assist) CM, 80% to <100% impaired. 1+: Pt supports self with upper extremities but requires therapist assistance. Pt performs >50% effort. (Min assist). CL, 60% to <80% impaired.   2: Pt supports self independently with both upper extremities (walker, crutches, parallel bars). CL, 60% to <80% impaired. 2+: Pt support self independently with 1 upper extremity (cane, crutch, 1 parallel bar). CK, 40% to <60% impaired. 3: Pt stands without upper extremity support for up to 30 seconds. CK, 40% to <60% impaired. 3+: Pt stands without upper extremity support for 30 seconds or greater. CJ, 20% to <40% impaired. 4: Pt independently moves and returns center of gravity 1-2 inches in one plane. CJ, 20% to <40% impaired. 4+: Pt independently moves and returns center of gravity 1-2 inches in multiple planes. CI, 1% to <20% impaired. 5: Pt independently moves and returns center of gravity in all planes greater than 2 inches. CH, 0% impaired. Eval Complexity: History: MEDIUM  Complexity : 1-2 comorbidities / personal factors will impact the outcome/ POC Exam:MEDIUM Complexity : 3 Standardized tests and measures addressing body structure, function, activity limitation and / or participation in recreation  Presentation: MEDIUM Complexity : Evolving with changing characteristics  Clinical Decision Making:Medium Complexity [facility] Standing Balance Scale 2/5 Overall Complexity:MEDIUM     Prior Level of Function/Home Situation:   Home Situation  Home Environment: Private residence  # Steps to Enter: 3  Rails to Enter: No  One/Two Story Residence: Two story (lives on 1st floor)  Lift Chair Available: No  Living Alone: No  Support Systems: Family member(s)  Patient Expects to be Discharged to[de-identified] Private residence  Current DME Used/Available at Home: [facility] Behavior:  Neurologic State: Alert  Cognition: Follows commands  Safety/Judgement: Fall prevention  Psychosocial  Patient Behaviors: Calm; Cooperative  Purposeful Interaction: Yes  Strength:    Strength:  Within functional limits  Manual Muscle Testing (LE)  Tone & Sensation:   Tone: Normal  Range Of Motion:  AROM: Within functional limits  Functional Mobility:  Bed Mobility:  Supine to Sit: Minimum assistance  Sit to Supine: Moderate assistance  Transfers:  Sit to Stand: Minimum assistance  Stand to Sit: Minimum assistance  Balance:   Sitting: Intact  Standing: Impaired  Standing - Static: Fair  Standing - Dynamic : Fair  Ambulation/Gait Training:  Distance (ft): 75 Feet (ft)  Assistive Device: Walker, rolling  Ambulation - Level of Assistance: Minimal assistance  Gait Description (WDL): Exceptions to WDL  Base of Support: Widened  Speed/Suzi: Shuffled  Therapeutic Exercises:   n/a  Pain:   Pre treatment pain level:  Post treatment pain level:  Pain Scale 1: Numeric (0 - 10)  Pain Intensity 1: 0  Pain Location 1: Back;Leg  Pain Orientation 1: Lower; Left  Pain Description 1: Radiating; [place]; Shooting  Pain Intervention(s) 1: Medication (see MAR)  Activity Tolerance:   Fair  Please refer to the flowsheet for vital signs taken during this treatment. After treatment:   [ ]         Patient left in no apparent distress sitting up in chair  [X]         Patient left in no apparent distress in bed  [X]         Call bell left within reach  [X]         Nursing notified  [ ]         Caregiver present  [ ]         Bed alarm activated      COMMUNICATION/EDUCATION:   [X]         Fall prevention education was provided and the patient/caregiver indicated understanding. [X]         Patient/family have participated as able in goal setting and plan of care. [X]         Patient/family agree to work toward stated goals and plan of care. [ ]         Patient understands intent and goals of therapy, but is neutral about his/her participation. [ ]         Patient is unable to participate in goal setting and plan of care. Patient educated on the role of physical therapy during the acute stay  and the importance of mobility. VU.        Thank you for this referral.  Alanna Napoles, PT   Time Calculation: 19 mins

## 2017-05-03 NOTE — ACP (ADVANCE CARE PLANNING)
Patient has designated __her daughters______________________ to participate in his/her discharge plan and to receive any needed information.      Name:  Des James or Beth Rodríguez  Address:  Phone number:  [phone] / [phone]

## 2017-05-03 NOTE — PROGRESS NOTES
[facility]   Discharge Planning/ Assessment    Reasons for Intervention: Chart reviewed. Met with pt., verified all demographics. [facility] has MCR/ ins. [facility] Dr. Lucina Ramirez is her PCP. NOK: Ashtyn Salgado, spouse, with whom she lives with & cares for as he has dementia. States her dtrs helping. [facility] [phone] [address] [facility]/or Windy Bernal, daughters, whom she designates can participate in her discharge process. Has walker & cane. Independent with ADL's prior to admit. Asked about SNF for rehab, states will be going home. PLAN: home. Will cont to follow for needs. Soledad Rice RN,ext. [phone].      High Risk Criteria  [x] Yes  []No   Physician Referral  [] Yes  [x]No        Date    Nursing Referral  [] Yes  [x]No        Date    Patient/Family Request  [] Yes  [x]No        Date       Resources:    Medicare  [x] Yes  []No   Medicaid  [] Yes  [x]No   No Resources  [] Yes  [x]No   Private Insurance  [x] Yes  []No    Name/Phone Number    Other  [] Yes  [x]No        (i.e. Workman's Comp)         Prior Services:    Prior Services  [] Yes  [x]No   Home Health  [] Yes  [x]No   [address]  [] Yes  [x]No        Number of [address]  [] Yes  [x]No       Meals on Wheels  [] Yes  [x]No   Office on Aging  [] Yes  [x]No   Transportation Services  [] Yes  [x]No   Nursing Home  [] Yes  [x]No        Nursing Home Name    [address]  [] Yes  [x]No        [address] Name    Other       Information Source:      Information obtained from  [x] Patient  [] Parent   [] [address]  [] Child  [] Spouse   [] Significant Other/Partner   [] Friend      [] EMS    [] Nursing Home Chart          [] Other:   Chart Review  [x] Yes  []No     Family/Support System:    Patient lives with  [] Alone    [x] Spouse   [] Significant Other  [] Children  [] Caretaker   [] Parent  [] Sibling     [] Other       Other Support System:    Is the patient responsible for care of others [x] Yes  []No   Information of person caring for patient on  discharge    Managers financial affairs independently  [x] Yes  []No   If no, explain:      Status Prior to Admission:    Mental Status  [x] Awake  [x] Alert  [x] Oriented  [x] Quiet/Calm [] Lethargic/Sedated   [] Disoriented  [] Restless/Anxious  [] Combative   Personal Care  [] Dependent  [x] [address]  [] Requires Assistance   Meal Preparation Ability  [x] Independent   [] Standby Assistance   [] Minimal Assistance   [] Moderate Assistance  [] Maximum Assistance     [] Total Assistance   Chores  [x] Independent with Chores   [] N/A Nursing Home Resident   [] Requires Assistance   Bowel/Bladder  [x] Continent  [] Catheter  [] Incontinent  [] Ostomy Self-Care    [] Urine Diversion Self-Care  [] Maximum Assistance     [] Total Assistance   Number of Persons needed for assistance    DME at home  [] [address] [facility], Ne, Idamae Yari  [x] [address] [place] [address], Ne, Straight   [] Commode    [] Bathroom/Grab Bars  [] Hospital Bed  [] Nebulizer  [] Oxygen           [] Raised Toilet Seat  [] Shower Chair  [] Side Rails for Bed   [] Tub Transfer Bench   [x] [place] Grills  [] Larance Eng, Standard      [] Other:   Vendor      Treatment Presently Receiving:    Current Treatments  [] Chemotherapy  [] Dialysis  [] Insulin  [] IVAB [x] IVF   [] O2  [] PCA   [x] PT   [] RT   [] Tube Feedings   [] Wound Care     Psychosocial Evaluation:    Verbalized Knowledge of Disease Process  [] Patient  []Family   Coping with Disease Process  [] Patient  []Family   Requires Further Counseling Coping with Disease Process  [] Patient  []Family     Identified Projected Needs:    Home Health Aid  [] Yes  [x]No   Transportation  [] Yes  [x]No   Education  [] Yes  [x]No        Specific Education     Financial Counseling  [] Yes  [x]No   Inability to Care for Self/Will Require 24 hour care  [] Yes  [x]No   Pain Management  [] Yes  [x]No   Home Infusion Therapy  [] Yes  [x]No   Oxygen Therapy  [] Yes  [x]No   DME  [] Yes  [x]No   Long Term Care Placement  [] Yes  [x]No   Rehab  [] Yes  [x]No   Physical Therapy  [] Yes  [x]No   Needs Anticipated At This Time  [] Yes  [x]No     Intra-Hospital Referral:    [facility]  [] Yes  [x]No     [] Yes  [x]No   Patient Representative  [] Yes  [x]No   Staff for Teaching Needs  [] Yes  [x]No   Specialty Teaching Needs     Diabetic Educator  [] Yes  [x]No   Referral for Diabetic Educator Needed  [] Yes  [x]No  If Yes, place order for Nutritionist or Diabetic Consult     Tentative Discharge Plan:    Home with No Services  [x] Yes  []No   Home with [address]  [] Yes  [x]No        If Yes, specify type    Home Care Program  [] Yes  [x]No        If Yes, specify type    Meals on Wheels  [] Yes  [x]No   Office of Aging  [] Yes  [x]No   NHP  [] Yes  [x]No   Return to the Nursing Home  [] Yes  [x]No   Rehab Therapy  [] Yes  [x]No   Acute Rehab  [] Yes  [x]No   Subacute Rehab  [] Yes  [x]No   Private Care  [] Yes  [x]No   Substance Abuse Referral  [] Yes  [x]No   Transportation  [] Yes  [x]No   Chore Service  [] Yes  [x]No   Inpatient Hospice  [] Yes  [x]No   OP RT  [] Yes  [x] No   OP Hemo  [] Yes  [x] No   OP PT  [] Yes  [x]No   Support Group  [] Yes  [x]No   Reach to Recovery  [] Yes  [x]No   OP Oncology Clinic  [] Yes  [x]No   Clinic Appointment  [] Yes  [x]No   DME  [] Yes  [x]No   Comments    Name of D/C Planner or  Given to Patient or Family Prabhu Palacios   Phone Number Pager: [phone]  [phone]   Date 05-   Time    If you are discharged home, whom do you designate to participate in your discharge plan and receive any information needed?      Enter name of Jazmine Mendez / Jack Sellers        Phone # of vani [phone] / [phone]        Address of designee         Updated         Patient refused to designate any           individual

## 2017-05-03 NOTE — ROUTINE PROCESS
1925: Assumed care. Behavior appropriate to situations. Assessment done. Denies any pain or discomfort at this time. Call light within reach. Call light within reach. 2005: Due med given. Medicated for pain per patient request. Will continue to monitor. 2146: No change from previous assessment. 2207: Due med given. 0123: Due med given. 0204: No change from previous assessment. 0430: Sleeping. 3995: Due meds given. 4209: No change from previous assessment. Slept good thru night. Needs attended. 0720: Bedside and Verbal shift change report given to Masood Higuera RN (oncoming nurse) by Byron Art RN (offgoing nurse). Report included the following information SBAR, Kardex, Intake/Output, MAR and Recent Results.

## 2017-05-03 NOTE — PROGRESS NOTES
0800 - Assumed care of pt. Assisted pt to bathroom using personal walker at bedside. Pt gets a sharp shooting pain in lower back radiates down to left leg. Pain medication and lidocaine patch applied along with scheduled AM meds. Dr. Dejan Farris on unit. Informed to please talk with daughter, Bassam Villa, as she states she has \"weaned off\" her mother off of certain medication last summer because they were making her \"loopy\" and she was falling a lot. Per daughter states her mother does not take celexa and ativan anymore. Dr. Dejan Farris advised will call her.

## 2017-05-03 NOTE — PROGRESS NOTES
General Internal Medicine/Geriatrics    Patient: Paulie Maria MRN: [id number]  CSN: [id number]    YOB: 1926  Age: 80 y.o. Sex: female    DOA: 5/2/2017 LOS:  LOS: 1 day                    Subjective:     Breathing better, no sig. Dyspnea  No cough  Low back pain less  Rt. Arm redness and swelling down some      Review of Systems:  Eyes: negative  Ears, Nose, Mouth, Throat, and Face: negative  Respiratory: negative for dyspnea on exertion  Cardiovascular: negative for chest pain  Gastrointestinal: negative for nausea, vomiting and diarrhea  Genitourinary:negative for dysuria and hematuria  Integument/Breast: negative  Hematologic/Lymphatic: negative for bleeding  Musculoskeletal:+ LBP  Neurological: negative for weakness  Behavioral/Psychiatric: negative for behavior problems  Endocrine: negative for temperature intolerance  Allergic/Immunologic: negative for hay fever    Objective:     Physical Exam:  Patient Vitals for the past 24 hrs:   Temp Pulse Resp BP SpO2   05/03/17 0627 98 °F (36.7 °C) 85 18 141/69 96 %   05/03/17 0204 98.3 °F (36.8 °C) 87 18 156/72 97 %   05/02/17 2146 98.1 °F (36.7 °C) 75 16 125/67 95 %   05/02/17 1826 97.7 °F (36.5 °C) 88 16 130/72 96 %   05/02/17 1407 97.7 °F (36.5 °C) 93 16 174/72 98 %   05/02/17 1333 - (!) 112 - - -   05/02/17 1257 - 92 - - -   05/02/17 1233 97.7 °F (36.5 °C) (!) 132 18 161/88 97 %     HR down  BP OK  HEENT: wnl  NECK:  No venous distention or adenopathy   HEART: RRR, no rubs or gallops  LUNGS: Clear to auscultation  ABDOMEN: soft with active BS. No tenderness, no distention, no organomegaly  EXT: Rt. Arm swelling down, less redness  SKIN: Normal turgor, no breaks  NEURO: Awake, alert, non focal      Tele, intermittent sinus tach., PAC's    Intake and Output:  Current Shift:     Last three shifts:  05/01 1901 - 05/03 0700  In: 1517.5 [P.O.:600;  I.V.:917.5]  Out: 450 [Urine:450]    Recent Results (from the past 24 hour(s))   CBC WITH AUTOMATED DIFF    Collection Time: 05/02/17  1:07 PM   Result Value Ref Range    WBC 7.8 4.6 - 13.2 K/uL    RBC 4.36 4.20 - 5.30 M/uL    HGB 12.6 12.0 - 16.0 g/dL    HCT 37.3 35.0 - 45.0 %    MCV 85.6 74.0 - 97.0 FL    MCH 28.9 24.0 - 34.0 PG    MCHC 33.8 31.0 - 37.0 g/dL    RDW 13.6 11.6 - 14.5 %    PLATELET 886 567 - 519 K/uL    MPV 8.1 (L) 9.2 - 11.8 FL    NEUTROPHILS 75 (H) 40 - 73 %    LYMPHOCYTES 14 (L) 21 - 52 %    MONOCYTES 8 3 - 10 %    EOSINOPHILS 2 0 - 5 %    BASOPHILS 1 0 - 2 %    ABS. NEUTROPHILS 5.9 1.8 - 8.0 K/UL    ABS. LYMPHOCYTES 1.1 0.9 - 3.6 K/UL    ABS. MONOCYTES 0.6 0.05 - 1.2 K/UL    ABS. EOSINOPHILS 0.2 0.0 - 0.4 K/UL    ABS. BASOPHILS 0.0 0.0 - 0.06 K/UL    DF AUTOMATED     METABOLIC PANEL, COMPREHENSIVE    Collection Time: 05/02/17  1:07 PM   Result Value Ref Range    Sodium 133 (L) 136 - 145 mmol/L    Potassium 3.2 (L) 3.5 - 5.5 mmol/L    Chloride 89 (L) 100 - 108 mmol/L    CO2 35 (H) 21 - 32 mmol/L    Anion gap 9 3.0 - 18 mmol/L    Glucose 144 (H) 74 - 99 mg/dL    BUN 11 7.0 - 18 MG/DL    Creatinine 1.12 0.6 - 1.3 MG/DL    BUN/Creatinine ratio 10 (L) 12 - 20      GFR est AA 55 (L) >60 ml/min/1.73m2    GFR est non-AA 46 (L) >60 ml/min/1.73m2    Calcium 9.0 8.5 - 10.1 MG/DL    Bilirubin, total 0.7 0.2 - 1.0 MG/DL    ALT (SGPT) 20 13 - 56 U/L    AST (SGOT) 27 15 - 37 U/L    Alk.  phosphatase 141 (H) 45 - 117 U/L    Protein, total 6.7 6.4 - 8.2 g/dL    Albumin 3.4 3.4 - 5.0 g/dL    Globulin 3.3 2.0 - 4.0 g/dL    A-G Ratio 1.0 0.8 - 1.7     TSH 3RD GENERATION    Collection Time: 05/02/17  1:07 PM   Result Value Ref Range    TSH 2.38 0.36 - 3.74 uIU/mL   EKG, 12 LEAD, INITIAL    Collection Time: 05/02/17  2:22 PM   Result Value Ref Range    Ventricular Rate 112 BPM    Atrial Rate 112 BPM    P-R Interval 274 ms    QRS Duration 82 ms    Q-T Interval 258 ms    QTC Calculation (Bezet) 352 ms    Calculated P Axis 65 degrees    Calculated R Axis 2 degrees    Calculated T Axis -127 degrees    Diagnosis Sinus tachycardia with 1st degree AV block  Cannot rule out Inferior infarct , age undetermined  ST & T wave abnormality, consider anterolateral ischemia  Abnormal ECG  When compared with ECG of 01-MAY-2014 13:45,  LA interval has increased  Minimal criteria for Inferior infarct are now present  ST now depressed in Anterior leads  T wave inversion now evident in Inferior leads  T wave inversion now evident in Anterolateral leads     CARDIAC PANEL,(CK, CKMB & TROPONIN)    Collection Time: 05/02/17  7:39 PM   Result Value Ref Range     26 - 192 U/L    CK - MB <1.0 <3.6 ng/ml    CK-MB Index Cannot be calulated 0.0 - 4.0 %    Troponin-I, Qt. 0.02 0.0 - 4.776 NG/ML   METABOLIC PANEL, BASIC    Collection Time: 05/03/17  1:45 AM   Result Value Ref Range    Sodium 135 (L) 136 - 145 mmol/L    Potassium 3.3 (L) 3.5 - 5.5 mmol/L    Chloride 93 (L) 100 - 108 mmol/L    CO2 32 21 - 32 mmol/L    Anion gap 10 3.0 - 18 mmol/L    Glucose 157 (H) 74 - 99 mg/dL    BUN 12 7.0 - 18 MG/DL    Creatinine 1.07 0.6 - 1.3 MG/DL    BUN/Creatinine ratio 11 (L) 12 - 20      GFR est AA 58 (L) >60 ml/min/1.73m2    GFR est non-AA 48 (L) >60 ml/min/1.73m2    Calcium 8.6 8.5 - 10.1 MG/DL   CARDIAC PANEL,(CK, CKMB & TROPONIN)    Collection Time: 05/03/17  1:45 AM   Result Value Ref Range    CK 99 26 - 192 U/L    CK - MB 1.4 <3.6 ng/ml    CK-MB Index 1.4 0.0 - 4.0 %    Troponin-I, Qt. 0.02 0.0 - 0.045 NG/ML       Xr Chest Port    Result Date: 5/2/2017  CHEST AP PORTABLE Indication: Dyspnea. Comparison: 05/01/2014. Findings: Minimal hazy opacity at the right peripheral lung base, possible developing atelectasis or infiltrate. Remainder of the lung parenchyma is clear. No evidence for pneumothorax or pleural effusion. Cardiac silhouette and pulmonary vascularity appear within normal limits. IMPRESSION: Questionable minimal developing atelectasis or infiltrate in the right lung base.  Recommend follow-up chest x-ray, preferably PA and lateral, when clinically feasible.       Current Facility-Administered Medications   Medication Dose Route Frequency    lidocaine (LIDODERM) 5 % patch 1 Patch  1 Patch TransDERmal Q24H    aspirin chewable tablet 81 mg  81 mg Oral DAILY    Please clarify patient's dose and frequency of citalopram   Other DAILY    cyanocobalamin tablet 1,000 mcg  1,000 mcg Oral DAILY    ergocalciferol (ERGOCALCIFEROL) capsule 50,000 Units  50,000 Units Oral Q7D    pantoprazole (PROTONIX) tablet 40 mg  40 mg Oral ACB    levothyroxine (SYNTHROID) tablet 100 mcg  100 mcg Oral ACB    montelukast (SINGULAIR) tablet 10 mg  10 mg Oral QHS    metoprolol succinate (TOPROL-XL) XL tablet 50 mg  50 mg Oral DAILY    amLODIPine (NORVASC) tablet 5 mg  5 mg Oral DAILY    mirtazapine (REMERON) tablet 30 mg  30 mg Oral QHS    LORazepam (ATIVAN) tablet 0.5 mg  0.5 mg Oral BID    heparin (porcine) injection 5,000 Units  5,000 Units SubCUTAneous Q12H    dexamethasone (DECADRON) 4 mg/mL injection 4 mg  4 mg IntraVENous Q8H    ceFAZolin (ANCEF) 1 g in 0.9% sodium chloride (MBP/ADV) 50 mL MBP  1 g IntraVENous Q12H    atorvastatin (LIPITOR) tablet 20 mg  20 mg Oral DAILY    traMADol (ULTRAM) tablet 50 mg  50 mg Oral Q6H PRN    calcium-vitamin D (OS-ORTIZ) 500 mg-200 unit tablet  1 Tab Oral DAILY WITH BREAKFAST    dextrose 5% - 0.9% NaCl with KCl 40 mEq/L infusion   IntraVENous CONTINUOUS       Lab Results   Component Value Date/Time    Glucose 157 05/03/2017 01:45 AM    Glucose 144 05/02/2017 01:07 PM    Glucose 109 05/01/2014 02:00 PM    Glucose 83 06/26/2013 03:50 AM    Glucose 93 06/24/2013 05:15 PM        Assessment     Active Problems:    Tachycardia (5/2/2017)      Dyspnea (5/3/2017)      Hypokalemia (5/3/2017)      Spinal stenosis of lumbar region (5/3/2017)      Right arm cellulitis (5/3/2017)      Lymphedema of right arm (5/3/2017)      PAC (premature atrial contraction) (5/3/2017)        Plan     Continue to replace K  Continue Tele  Repeat CXR PA, and lateral., no Sx  Continue Ancef for arm cellulitis  Continue decadron for spinal stenosis, add lidoderm patch, PT  Other labs stable'  monitor        Sharan Rubin MD  5/3/2017, 8:02 AM

## 2017-05-04 ENCOUNTER — HOME HEALTH ADMISSION (OUTPATIENT)
Dept: HOME HEALTH SERVICES | Facility: HOME HEALTH | Age: 82
End: 2017-05-04
Payer: MEDICARE

## 2017-05-04 VITALS
RESPIRATION RATE: 18 BRPM | WEIGHT: 164.1 LBS | HEIGHT: 62 IN | TEMPERATURE: 97.5 F | OXYGEN SATURATION: 99 % | BODY MASS INDEX: 30.2 KG/M2 | SYSTOLIC BLOOD PRESSURE: 145 MMHG | HEART RATE: 90 BPM | DIASTOLIC BLOOD PRESSURE: 68 MMHG

## 2017-05-04 LAB
ANION GAP BLD CALC-SCNC: 10 MMOL/L (ref 3–18)
BUN SERPL-MCNC: 14 MG/DL (ref 7–18)
BUN/CREAT SERPL: 14 (ref 12–20)
CALCIUM SERPL-MCNC: 8.2 MG/DL (ref 8.5–10.1)
CHLORIDE SERPL-SCNC: 98 MMOL/L (ref 100–108)
CO2 SERPL-SCNC: 29 MMOL/L (ref 21–32)
CREAT SERPL-MCNC: 1 MG/DL (ref 0.6–1.3)
GLUCOSE SERPL-MCNC: 148 MG/DL (ref 74–99)
POTASSIUM SERPL-SCNC: 3.7 MMOL/L (ref 3.5–5.5)
SODIUM SERPL-SCNC: 137 MMOL/L (ref 136–145)

## 2017-05-04 PROCEDURE — 93306 TTE W/DOPPLER COMPLETE: CPT

## 2017-05-04 PROCEDURE — 74011250637 HC RX REV CODE- 250/637: Performed by: INTERNAL MEDICINE

## 2017-05-04 PROCEDURE — 74011000258 HC RX REV CODE- 258: Performed by: INTERNAL MEDICINE

## 2017-05-04 PROCEDURE — 74011250636 HC RX REV CODE- 250/636: Performed by: INTERNAL MEDICINE

## 2017-05-04 PROCEDURE — 80048 BASIC METABOLIC PNL TOTAL CA: CPT | Performed by: INTERNAL MEDICINE

## 2017-05-04 PROCEDURE — 36415 COLL VENOUS BLD VENIPUNCTURE: CPT | Performed by: INTERNAL MEDICINE

## 2017-05-04 PROCEDURE — 97116 GAIT TRAINING THERAPY: CPT

## 2017-05-04 RX ORDER — LIDOCAINE 50 MG/G
PATCH TOPICAL
Qty: 30 EACH | Refills: 2 | Status: SHIPPED | OUTPATIENT
Start: 2017-05-04

## 2017-05-04 RX ORDER — METOPROLOL SUCCINATE 50 MG/1
50 TABLET, EXTENDED RELEASE ORAL DAILY
Qty: 30 TAB | Refills: 0 | Status: SHIPPED
Start: 2017-05-04 | End: 2017-05-04

## 2017-05-04 RX ORDER — METOPROLOL SUCCINATE 50 MG/1
50 TABLET, EXTENDED RELEASE ORAL DAILY
Qty: 30 TAB | Refills: 0 | Status: SHIPPED | OUTPATIENT
Start: 2017-05-04

## 2017-05-04 RX ORDER — DEXAMETHASONE 2 MG/1
2 TABLET ORAL 2 TIMES DAILY WITH MEALS
Qty: 6 TAB | Refills: 0 | Status: SHIPPED | OUTPATIENT
Start: 2017-05-04 | End: 2017-05-07

## 2017-05-04 RX ORDER — TRAMADOL HYDROCHLORIDE 50 MG/1
50 TABLET ORAL
Qty: 20 TAB | Refills: 0 | Status: SHIPPED | OUTPATIENT
Start: 2017-05-04 | End: 2019-01-01

## 2017-05-04 RX ORDER — MIRTAZAPINE 30 MG/1
30 TABLET, FILM COATED ORAL
Qty: 30 TAB | Refills: 0 | Status: SHIPPED
Start: 2017-05-04

## 2017-05-04 RX ORDER — CEPHALEXIN 250 MG/1
500 CAPSULE ORAL 3 TIMES DAILY
Qty: 21 CAP | Refills: 0 | Status: SHIPPED | OUTPATIENT
Start: 2017-05-04 | End: 2019-01-01

## 2017-05-04 RX ADMIN — DEXTROSE MONOHYDRATE, SODIUM CHLORIDE, AND POTASSIUM CHLORIDE: 50; 9; 2.98 INJECTION, SOLUTION INTRAVENOUS at 04:07

## 2017-05-04 RX ADMIN — CYANOCOBALAMIN TAB 1000 MCG 1000 MCG: 1000 TAB at 09:29

## 2017-05-04 RX ADMIN — PANTOPRAZOLE SODIUM 40 MG: 40 TABLET, DELAYED RELEASE ORAL at 09:29

## 2017-05-04 RX ADMIN — METOPROLOL SUCCINATE 50 MG: 50 TABLET, EXTENDED RELEASE ORAL at 09:29

## 2017-05-04 RX ADMIN — ASPIRIN 81 MG CHEWABLE TABLET 81 MG: 81 TABLET CHEWABLE at 09:29

## 2017-05-04 RX ADMIN — LORAZEPAM 0.5 MG: 0.5 TABLET ORAL at 09:29

## 2017-05-04 RX ADMIN — CEFAZOLIN SODIUM 1 G: 1 INJECTION, POWDER, FOR SOLUTION INTRAMUSCULAR; INTRAVENOUS at 00:02

## 2017-05-04 RX ADMIN — AMLODIPINE BESYLATE 5 MG: 5 TABLET ORAL at 09:29

## 2017-05-04 RX ADMIN — LEVOTHYROXINE SODIUM 100 MCG: 100 TABLET ORAL at 09:29

## 2017-05-04 RX ADMIN — CALCIUM CARBONATE 500 MG (1,250 MG)-VITAMIN D3 200 UNIT TABLET 1 TABLET: at 09:29

## 2017-05-04 RX ADMIN — DEXAMETHASONE SODIUM PHOSPHATE 4 MG: 4 INJECTION, SOLUTION INTRAMUSCULAR; INTRAVENOUS at 05:34

## 2017-05-04 RX ADMIN — ATORVASTATIN CALCIUM 20 MG: 20 TABLET, FILM COATED ORAL at 09:29

## 2017-05-04 RX ADMIN — HEPARIN SODIUM 5000 UNITS: 5000 INJECTION, SOLUTION INTRAVENOUS; SUBCUTANEOUS at 00:02

## 2017-05-04 NOTE — ROUTINE PROCESS
1955: Assumed care. Behavior appropriate to situations. Assessment done. Denies any pain or discomfort at this time. Call light within reach. 2219: No change from previous assessment. Due meds given. Medicated for pain per patient request. Will continue to monitor. 0002: Due med given. 0150: No change from previous assessment. 0430:  Sleeping. 18: Due med given. V/s taken. Assisted patient to bathroom & back to bed.   0645: Slept good thru night. Needs attended. 5727: Bedside and Verbal shift change report given to Jorge Mcdaniel RN (oncoming nurse) by Brian Narayanan RN (offgoing nurse). Report included the following information SBAR, Kardex, Intake/Output, MAR and Recent Results.

## 2017-05-04 NOTE — DISCHARGE INSTRUCTIONS
DISCHARGE SUMMARY from Nurse    The following personal items are in your possession at time of discharge:    Dental Appliances: None     Hearing Aids/Status: Bilateral, With patient  Home Medications: None  Jewelry: Earrings, Ring  Clothing: At bedside, Footwear, Pants, Shirt, Undergarments, Other (comment) (breast implants (2) inside bra)  Other Valuables: Purse, Sent home             PATIENT INSTRUCTIONS:    After general anesthesia or intravenous sedation, for 24 hours or while taking prescription Narcotics:  · Limit your activities  · Do not drive and operate hazardous machinery  · Do not make important personal or business decisions  · Do  not drink alcoholic beverages  · If you have not urinated within 8 hours after discharge, please contact your surgeon on call. Report the following to your surgeon:  · Excessive pain, swelling, redness or odor of or around the surgical area  · Temperature over 100.5  · Nausea and vomiting lasting longer than 4 hours or if unable to take medications  · Any signs of decreased circulation or nerve impairment to extremity: change in color, persistent  numbness, tingling, coldness or increase pain  · Any questions        What to do at Home:  Recommended activity: Activity as tolerated,     If you experience any of the following symptoms increased pain, please follow up with primary care provider. *  Please give a list of your current medications to your Primary Care Provider. *  Please update this list whenever your medications are discontinued, doses are      changed, or new medications (including over-the-counter products) are added. *  Please carry medication information at all times in case of emergency situations. These are general instructions for a healthy lifestyle:    No smoking/ No tobacco products/ Avoid exposure to second hand smoke    Surgeon General's Warning:  Quitting smoking now greatly reduces serious risk to your health.     Obesity, smoking, and sedentary lifestyle greatly increases your risk for illness    A healthy diet, regular physical exercise & weight monitoring are important for maintaining a healthy lifestyle    You may be retaining fluid if you have a history of heart failure or if you experience any of the following symptoms:  Weight gain of 3 pounds or more overnight or 5 pounds in a week, increased swelling in our hands or feet or shortness of breath while lying flat in bed. Please call your doctor as soon as you notice any of these symptoms; do not wait until your next office visit. Recognize signs and symptoms of STROKE:    F-face looks uneven    A-arms unable to move or move unevenly    S-speech slurred or non-existent    T-time-call 911 as soon as signs and symptoms begin-DO NOT go       Back to bed or wait to see if you get better-TIME IS BRAIN. Warning Signs of HEART ATTACK     Call 911 if you have these symptoms:   Chest discomfort. Most heart attacks involve discomfort in the center of the chest that lasts more than a few minutes, or that goes away and comes back. It can feel like uncomfortable pressure, squeezing, fullness, or pain.  Discomfort in other areas of the upper body. Symptoms can include pain or discomfort in one or both arms, the back, neck, jaw, or stomach.  Shortness of breath with or without chest discomfort.  Other signs may include breaking out in a cold sweat, nausea, or lightheadedness. Don't wait more than five minutes to call 911 - MINUTES MATTER! Fast action can save your life. Calling 911 is almost always the fastest way to get lifesaving treatment. Emergency Medical Services staff can begin treatment when they arrive -- up to an hour sooner than if someone gets to the hospital by car. The discharge information has been reviewed with the patient. The patient verbalized understanding.     Discharge medications reviewed with the patient and appropriate educational materials and side effects teaching were provided.

## 2017-05-04 NOTE — DISCHARGE SUMMARY
[facility] SUMMARY    Name:  Emilia Salgado  MR#:  [id number]  :  10/14/1926  Account #:  [de-identified]  Date of Adm:  2017  Date of Discharge:  2017      DISCHARGE DIAGNOSES  1. Episode of dyspnea, tachycardia and presyncope, undetermined  etiology, possibly an anxiety attack. 2. Severe lumbar spinal stenosis, chronic low back pain, improved. 3. Right arm cellulitis on underlying lymphedema, improved. 4. Hypokalemia, corrected. 5. Chronic anxiety disorder. 6. Premature atrial contractions. 7. Hypertension. 8. Hypercholesterolemia. 9. Remote history of coronary artery disease. 10. History of orthostatic hypotension, stable. 11. Hypothyroidism. 12. B12 deficiency. 13. History of iron-deficiency anemia in the past.  14. Bronchial asthma. 15. Gastroesophageal reflux disease. 16. Osteoporosis. 17. Spinal stenosis. 18. Osteoarthritis. 19. History of breast cancer, status post right mastectomy with  persistent right upper arm lymphedema. 20. Tonsillectomy and adenoidectomy. 21. Knee replacement. 22. Hysterectomy. DISCHARGE MEDICATIONS  The patient will resume all pre-admit medications as per history and  physical, in addition to the following changes:  1. Toprol-XL increased to 50 mg daily. 2. Decadron 2 mg tablets twice daily x3 days. 3. Keflex 500 mg t.i.d. x1 more week. 4. Tramadol 50 mg twice daily as needed. 5. Lidoderm patch to lumbar spine area daily as directed. DISPOSITION: Home with physical therapy. The patient will get an  echocardiogram prior to discharge for completeness of the workup. We  will followup on results in the office next week. REASON FOR ADMISSION: This is a 35-year-old white female with  multiple medical problems as above, who was admitted because of an  acute episode of dyspnea, tachycardia and presyncope in the office,  severe low back pain and right upper extremity cellulitis.  For details,  see admission history and physical.    The patient's EKG showed sinus tachycardia, she was continued on  telemetry and had no significant arrhythmia except premature atrial  contractions. Thyroid profile was satisfactory. Her Toprol dose was  increased and she continued on the monitor without any arrhythmia. She had no recurrence of dyspnea or presyncope. She was seen  physical therapy, mobilized and progressing satisfactorily. The patient  was put on IV Decadron for her spinal stenosis and arthritis and her  pain has improved dramatically. She was also put on Lidoderm patch  in addition to low-dose Tramadol. She was treated with IV Ancef for  her right upper extremity cellulitis and this has improved dramatically. Also, her arm was kept slightly elevated while in bed. the patient's  potassium was low and that has been corrected. Her vital signs  remained stable. She has been stable from the hemodynamic and  cardiopulmonary standpoint. Anxiety is under control. As above, she  was seen by physical therapy and home health recommended and she  is agreeable to that. Today, her pain is under control. Her vital signs  stable, lungs clear, heart is regular, neurologic exam is intact, and she  is felt to have achieved maximum benefits of hospital stay.         MD OVI Olmedo / Akiko Malik  D:  05/04/2017   08:31  T:  05/04/2017   08:59  Job #:  [id number]

## 2017-05-04 NOTE — PROGRESS NOTES
0: RN Kita cleared patient for activity. Patient finishing breakfast at this time. Will re-attempt PT as schedule permits.      Thank you,   Aubree Curry, PT, DPT

## 2017-05-04 NOTE — PROGRESS NOTES
Problem: Mobility Impaired (Adult and Pediatric)  Goal: *Acute Goals and Plan of Care (Insert Text)  Physical Therapy Goals  Initiated 5/3/2017 and to be accomplished within 7 day(s)  1. Patient will move from supine to sit and sit to supine in bed with modified independence. 2. Patient will transfer from bed to chair and chair to bed with modified independence using the least restrictive device. 3. Patient will perform sit to stand with modified independence. 4. Patient will ambulate with modified independence for 150 feet with the least restrictive device. 5. Patient will ascend/descend 3 stairs with one handrail(s) with supervision/set-up. Outcome: Progressing Towards Goal  PHYSICAL THERAPY TREATMENT     Patient: Caryle Lagos (49 y.o. female)  Date: 5/4/2017  Diagnosis: Tachycardia  Near Syncope   Dyspnea  Tachycardia <principal problem not specified>  Precautions: Fall  Chart, physical therapy assessment, plan of care and goals were reviewed. ASSESSMENT:  Mod I with bed mobility. Supervision with sit to stand. Amb 75 ft supervision with standard walker. Gait steady today. Able to stand at walker and converse 2 min with supervision for balance and no shortness of breath noted. Discharge rec to home. EDUCATION: bed mobility, transfers, balance, amb  Progression toward goals:        Improving appropriately and progressing toward goals       PLAN:  Patient continues to benefit from skilled intervention to address the above impairments. Continue treatment per established plan of care. Discharge Recommendations:  Home Health  Further Equipment Recommendations for Discharge:  N/A       SUBJECTIVE:   Patient stated I'm going home today.       OBJECTIVE DATA SUMMARY:   Critical Behavior:  Neurologic State: Alert  Orientation Level: Oriented X4  Cognition: Follows commands  Safety/Judgement: Fall prevention, Awareness of environment     [employer] Balance Scale 3/5  0: Pt performs 25% or less of standing activity (Max assist) CN, 100% impaired. 1: Pt supports self with upper extremities but requires therapist assistance. Pt performs 25-50% of effort (Mod assist) CM, 80% to <100% impaired. 1+: Pt supports self with upper extremities but requires therapist assistance. Pt performs >50% effort. (Min assist). CL, 60% to <80% impaired. 2: Pt supports self independently with both upper extremities (walker, crutches, parallel bars). CL, 60% to <80% impaired. 2+: Pt support self independently with 1 upper extremity (cane, crutch, 1 parallel bar). CK, 40% to <60% impaired. 3: Pt stands without upper extremity support for up to 30 seconds. CK, 40% to <60% impaired. 3+: Pt stands without upper extremity support for 30 seconds or greater. CJ, 20% to <40% impaired. 4: Pt independently moves and returns center of gravity 1-2 inches in one plane. CJ, 20% to <40% impaired. 4+: Pt independently moves and returns center of gravity 1-2 inches in multiple planes. CI, 1% to <20% impaired. 5: Pt independently moves and returns center of gravity in all planes greater than 2 inches. CH, 0% impaired. G CODE:Mobility [id number] Current  CK= 40-59%   Goal  CJ= 20-39%.   The severity rating is based on the Other [facility] Balance Scale 3/5  Functional Mobility Training:  Bed Mobility:  Supine to Sit: Modified independent  Sit to Supine: Modified independent  Transfers:  Sit to Stand: Supervision  Stand to Sit: Supervision  Balance:  Sitting: Intact  Standing: Impaired  Standing - Static: Fair  Standing - Dynamic : Fair  Ambulation/Gait Training:  Distance (ft): 75 Feet (ft)  Assistive Device: Walker  Ambulation - Level of Assistance: Supervision  Gait Description (WDL): Exceptions to WDL  Neuro Re-Education:  n/a  Therapeutic Exercises:   Standing 2 min at walker     Vital Signs per chart  Temp: 97.5 °F (36.4 °C)     Pulse (Heart Rate): 90     BP: 145/68     Resp Rate: 18     O2 Sat (%): 99 %  Pain:  Pain Scale 1: Numeric (0 - 10)  Pain Intensity 1: 0  Activity Tolerance:   Fair     After treatment:   Patient left in no apparent distress in bed  Call bell left within reach  Nursing notified            Renae Sep, PT   Time Calculation: 17 mins

## 2017-05-04 NOTE — PROGRESS NOTES
0830 Assumed care of patient. patient awake and alert. Assisted patient to restroom to void. No c/o pain or discomfort at this time. patine back in bed resting comfortably with IV flyuids infusing. Right arm elevated on pillows due to swelling/lymphodema. Call bell within reach. Will continue to monitor. 1050 All am medications given and tolerated well    1200 Reassessment done and no changes noted    1400 Patient discharged home. Discharge instructions given and discharge paperwork signed.

## 2017-05-05 ENCOUNTER — HOME CARE VISIT (OUTPATIENT)
Dept: HOME HEALTH SERVICES | Facility: HOME HEALTH | Age: 82
End: 2017-05-05

## 2017-05-05 ENCOUNTER — HOME CARE VISIT (OUTPATIENT)
Dept: SCHEDULING | Facility: HOME HEALTH | Age: 82
End: 2017-05-05
Payer: MEDICARE

## 2017-05-05 VITALS — HEART RATE: 80 BPM | OXYGEN SATURATION: 96 % | DIASTOLIC BLOOD PRESSURE: 62 MMHG | SYSTOLIC BLOOD PRESSURE: 122 MMHG

## 2017-05-05 PROCEDURE — 400013 HH SOC

## 2017-05-05 PROCEDURE — 3331090002 HH PPS REVENUE DEBIT

## 2017-05-05 PROCEDURE — G0151 HHCP-SERV OF PT,EA 15 MIN: HCPCS

## 2017-05-05 PROCEDURE — 3331090001 HH PPS REVENUE CREDIT

## 2017-05-06 PROCEDURE — 3331090001 HH PPS REVENUE CREDIT

## 2017-05-06 PROCEDURE — 3331090002 HH PPS REVENUE DEBIT

## 2017-05-07 PROCEDURE — 3331090002 HH PPS REVENUE DEBIT

## 2017-05-07 PROCEDURE — 3331090001 HH PPS REVENUE CREDIT

## 2017-05-08 ENCOUNTER — HOME CARE VISIT (OUTPATIENT)
Dept: HOME HEALTH SERVICES | Facility: HOME HEALTH | Age: 82
End: 2017-05-08
Payer: MEDICARE

## 2017-05-08 ENCOUNTER — HOME CARE VISIT (OUTPATIENT)
Dept: SCHEDULING | Facility: HOME HEALTH | Age: 82
End: 2017-05-08
Payer: MEDICARE

## 2017-05-08 PROCEDURE — 3331090001 HH PPS REVENUE CREDIT

## 2017-05-08 PROCEDURE — 3331090002 HH PPS REVENUE DEBIT

## 2017-05-08 PROCEDURE — G0152 HHCP-SERV OF OT,EA 15 MIN: HCPCS

## 2017-05-09 ENCOUNTER — HOME CARE VISIT (OUTPATIENT)
Dept: SCHEDULING | Facility: HOME HEALTH | Age: 82
End: 2017-05-09
Payer: MEDICARE

## 2017-05-09 VITALS — DIASTOLIC BLOOD PRESSURE: 54 MMHG | SYSTOLIC BLOOD PRESSURE: 106 MMHG | OXYGEN SATURATION: 91 % | HEART RATE: 91 BPM

## 2017-05-09 PROCEDURE — G0157 HHC PT ASSISTANT EA 15: HCPCS

## 2017-05-09 PROCEDURE — 3331090001 HH PPS REVENUE CREDIT

## 2017-05-09 PROCEDURE — 3331090002 HH PPS REVENUE DEBIT

## 2017-05-09 NOTE — ANCILLARY DISCHARGE INSTRUCTIONS
[facility]  Discharge Phone Call       After-Care Discharge Phone Call Questions: no answer     Were you able to get your prescriptions filled? Comment:      [] Yes  []No    Comment if answer is \"No\"   Are you taking your medication(s) as your doctor ordered? Do you understand the purpose of your medications? Comment:    [] Yes  []No    Comment if answer is \"No\"   Are you taking any other medications that are not on the list?  Comment:      [] Yes  []No    Comment if answer is \"Yes\"   Do you have any questions about your medications? Are you aware of potential side effects? Comment:    [] Yes  []No    Comment if answer is \"Yes\"   Did you make your follow-up appointments (if the hospital did not do this before  discharge)? Comment:    [] Yes  []No    Comment if answer is \"No\"   Is there any reason you might not be able to keep your follow-up appointments? Comment:     [] Yes  []No    Comment if answer is \"Yes\"   Do you have any questions about your care plan? Are you aware of what health problems to be alert for? Comment:    [] Yes  []No    Comment if answer is \"Yes\"   Do you have a good understanding of how you should manage your health? Comment:    [] Yes  []No    Comment if answer is \"Yes\"   Do you know which symptoms to watch for that would mean you would need to call your doctor right away? Comment:      [] Yes  []No    Comment if answer is \"No\"   Do you have any questions about the follow up process or any instructions that we have provided? Comment:    [] Yes  []No    Comment if answer is \"Yes\"   Did staff take your preferences into account?         [] Yes  []No    Comment if answer is \"Yes\"

## 2017-05-10 ENCOUNTER — HOME CARE VISIT (OUTPATIENT)
Dept: SCHEDULING | Facility: HOME HEALTH | Age: 82
End: 2017-05-10
Payer: MEDICARE

## 2017-05-10 ENCOUNTER — HOME CARE VISIT (OUTPATIENT)
Dept: HOME HEALTH SERVICES | Facility: HOME HEALTH | Age: 82
End: 2017-05-10
Payer: MEDICARE

## 2017-05-10 VITALS — SYSTOLIC BLOOD PRESSURE: 132 MMHG | HEART RATE: 94 BPM | OXYGEN SATURATION: 95 % | DIASTOLIC BLOOD PRESSURE: 60 MMHG

## 2017-05-10 PROCEDURE — 3331090002 HH PPS REVENUE DEBIT

## 2017-05-10 PROCEDURE — G0152 HHCP-SERV OF OT,EA 15 MIN: HCPCS

## 2017-05-10 PROCEDURE — 3331090001 HH PPS REVENUE CREDIT

## 2017-05-11 ENCOUNTER — HOME CARE VISIT (OUTPATIENT)
Dept: SCHEDULING | Facility: HOME HEALTH | Age: 82
End: 2017-05-11
Payer: MEDICARE

## 2017-05-11 ENCOUNTER — HOME CARE VISIT (OUTPATIENT)
Dept: HOME HEALTH SERVICES | Facility: HOME HEALTH | Age: 82
End: 2017-05-11
Payer: MEDICARE

## 2017-05-11 VITALS — HEART RATE: 87 BPM | OXYGEN SATURATION: 97 % | SYSTOLIC BLOOD PRESSURE: 110 MMHG | DIASTOLIC BLOOD PRESSURE: 58 MMHG

## 2017-05-11 PROCEDURE — G0157 HHC PT ASSISTANT EA 15: HCPCS

## 2017-05-11 PROCEDURE — 3331090002 HH PPS REVENUE DEBIT

## 2017-05-11 PROCEDURE — 3331090001 HH PPS REVENUE CREDIT

## 2017-05-12 VITALS — OXYGEN SATURATION: 95 % | HEART RATE: 76 BPM | DIASTOLIC BLOOD PRESSURE: 56 MMHG | SYSTOLIC BLOOD PRESSURE: 132 MMHG

## 2017-05-12 PROCEDURE — 3331090002 HH PPS REVENUE DEBIT

## 2017-05-12 PROCEDURE — 3331090001 HH PPS REVENUE CREDIT

## 2017-05-13 PROCEDURE — 3331090002 HH PPS REVENUE DEBIT

## 2017-05-13 PROCEDURE — 3331090001 HH PPS REVENUE CREDIT

## 2017-05-14 PROCEDURE — 3331090001 HH PPS REVENUE CREDIT

## 2017-05-14 PROCEDURE — 3331090002 HH PPS REVENUE DEBIT

## 2017-05-15 ENCOUNTER — HOME CARE VISIT (OUTPATIENT)
Dept: SCHEDULING | Facility: HOME HEALTH | Age: 82
End: 2017-05-15
Payer: MEDICARE

## 2017-05-15 PROCEDURE — 3331090001 HH PPS REVENUE CREDIT

## 2017-05-15 PROCEDURE — 3331090002 HH PPS REVENUE DEBIT

## 2017-05-15 PROCEDURE — G0152 HHCP-SERV OF OT,EA 15 MIN: HCPCS

## 2017-05-16 ENCOUNTER — HOME CARE VISIT (OUTPATIENT)
Dept: SCHEDULING | Facility: HOME HEALTH | Age: 82
End: 2017-05-16
Payer: MEDICARE

## 2017-05-16 VITALS — HEART RATE: 77 BPM | OXYGEN SATURATION: 97 % | SYSTOLIC BLOOD PRESSURE: 129 MMHG | DIASTOLIC BLOOD PRESSURE: 58 MMHG

## 2017-05-16 PROCEDURE — 3331090001 HH PPS REVENUE CREDIT

## 2017-05-16 PROCEDURE — G0157 HHC PT ASSISTANT EA 15: HCPCS

## 2017-05-16 PROCEDURE — 3331090002 HH PPS REVENUE DEBIT

## 2017-05-17 VITALS — DIASTOLIC BLOOD PRESSURE: 62 MMHG | SYSTOLIC BLOOD PRESSURE: 137 MMHG | HEART RATE: 86 BPM | OXYGEN SATURATION: 95 %

## 2017-05-17 PROCEDURE — 3331090002 HH PPS REVENUE DEBIT

## 2017-05-17 PROCEDURE — 3331090001 HH PPS REVENUE CREDIT

## 2017-05-18 ENCOUNTER — HOME CARE VISIT (OUTPATIENT)
Dept: SCHEDULING | Facility: HOME HEALTH | Age: 82
End: 2017-05-18
Payer: MEDICARE

## 2017-05-18 PROCEDURE — 3331090001 HH PPS REVENUE CREDIT

## 2017-05-18 PROCEDURE — 3331090002 HH PPS REVENUE DEBIT

## 2017-05-18 PROCEDURE — G0157 HHC PT ASSISTANT EA 15: HCPCS

## 2017-05-18 PROCEDURE — G0152 HHCP-SERV OF OT,EA 15 MIN: HCPCS

## 2017-05-19 VITALS — SYSTOLIC BLOOD PRESSURE: 142 MMHG | DIASTOLIC BLOOD PRESSURE: 60 MMHG | OXYGEN SATURATION: 97 % | HEART RATE: 87 BPM

## 2017-05-19 VITALS — OXYGEN SATURATION: 98 % | SYSTOLIC BLOOD PRESSURE: 142 MMHG | DIASTOLIC BLOOD PRESSURE: 60 MMHG | HEART RATE: 90 BPM

## 2017-05-19 PROCEDURE — 3331090001 HH PPS REVENUE CREDIT

## 2017-05-19 PROCEDURE — 3331090002 HH PPS REVENUE DEBIT

## 2017-05-20 PROCEDURE — 3331090001 HH PPS REVENUE CREDIT

## 2017-05-20 PROCEDURE — 3331090002 HH PPS REVENUE DEBIT

## 2017-05-21 PROCEDURE — 3331090002 HH PPS REVENUE DEBIT

## 2017-05-21 PROCEDURE — 3331090001 HH PPS REVENUE CREDIT

## 2017-05-22 ENCOUNTER — HOME CARE VISIT (OUTPATIENT)
Dept: SCHEDULING | Facility: HOME HEALTH | Age: 82
End: 2017-05-22
Payer: MEDICARE

## 2017-05-22 VITALS — DIASTOLIC BLOOD PRESSURE: 62 MMHG | SYSTOLIC BLOOD PRESSURE: 147 MMHG | HEART RATE: 78 BPM | OXYGEN SATURATION: 97 %

## 2017-05-22 PROCEDURE — 3331090001 HH PPS REVENUE CREDIT

## 2017-05-22 PROCEDURE — 3331090002 HH PPS REVENUE DEBIT

## 2017-05-22 PROCEDURE — G0152 HHCP-SERV OF OT,EA 15 MIN: HCPCS

## 2017-05-23 ENCOUNTER — HOME CARE VISIT (OUTPATIENT)
Dept: SCHEDULING | Facility: HOME HEALTH | Age: 82
End: 2017-05-23
Payer: MEDICARE

## 2017-05-23 VITALS — SYSTOLIC BLOOD PRESSURE: 118 MMHG | DIASTOLIC BLOOD PRESSURE: 60 MMHG | HEART RATE: 85 BPM | OXYGEN SATURATION: 98 %

## 2017-05-23 PROCEDURE — 3331090002 HH PPS REVENUE DEBIT

## 2017-05-23 PROCEDURE — 3331090001 HH PPS REVENUE CREDIT

## 2017-05-23 PROCEDURE — G0151 HHCP-SERV OF PT,EA 15 MIN: HCPCS

## 2017-05-24 ENCOUNTER — HOME CARE VISIT (OUTPATIENT)
Dept: SCHEDULING | Facility: HOME HEALTH | Age: 82
End: 2017-05-24
Payer: MEDICARE

## 2017-05-24 VITALS — OXYGEN SATURATION: 92 % | DIASTOLIC BLOOD PRESSURE: 89 MMHG | SYSTOLIC BLOOD PRESSURE: 142 MMHG | HEART RATE: 90 BPM

## 2017-05-24 PROCEDURE — 3331090002 HH PPS REVENUE DEBIT

## 2017-05-24 PROCEDURE — 3331090001 HH PPS REVENUE CREDIT

## 2017-05-24 PROCEDURE — G0152 HHCP-SERV OF OT,EA 15 MIN: HCPCS

## 2017-05-25 ENCOUNTER — HOME CARE VISIT (OUTPATIENT)
Dept: HOME HEALTH SERVICES | Facility: HOME HEALTH | Age: 82
End: 2017-05-25
Payer: MEDICARE

## 2017-05-25 ENCOUNTER — HOME CARE VISIT (OUTPATIENT)
Dept: SCHEDULING | Facility: HOME HEALTH | Age: 82
End: 2017-05-25
Payer: MEDICARE

## 2017-05-25 PROCEDURE — 3331090002 HH PPS REVENUE DEBIT

## 2017-05-25 PROCEDURE — G0157 HHC PT ASSISTANT EA 15: HCPCS

## 2017-05-25 PROCEDURE — 3331090001 HH PPS REVENUE CREDIT

## 2017-05-26 PROCEDURE — 3331090002 HH PPS REVENUE DEBIT

## 2017-05-26 PROCEDURE — 3331090001 HH PPS REVENUE CREDIT

## 2017-05-27 PROCEDURE — 3331090001 HH PPS REVENUE CREDIT

## 2017-05-27 PROCEDURE — 3331090002 HH PPS REVENUE DEBIT

## 2017-05-28 PROCEDURE — 3331090002 HH PPS REVENUE DEBIT

## 2017-05-28 PROCEDURE — 3331090001 HH PPS REVENUE CREDIT

## 2017-05-29 PROCEDURE — 3331090001 HH PPS REVENUE CREDIT

## 2017-05-29 PROCEDURE — 3331090002 HH PPS REVENUE DEBIT

## 2017-05-30 ENCOUNTER — HOME CARE VISIT (OUTPATIENT)
Dept: SCHEDULING | Facility: HOME HEALTH | Age: 82
End: 2017-05-30
Payer: MEDICARE

## 2017-05-30 VITALS — DIASTOLIC BLOOD PRESSURE: 54 MMHG | OXYGEN SATURATION: 96 % | HEART RATE: 94 BPM | SYSTOLIC BLOOD PRESSURE: 118 MMHG

## 2017-05-30 PROCEDURE — 3331090002 HH PPS REVENUE DEBIT

## 2017-05-30 PROCEDURE — 3331090001 HH PPS REVENUE CREDIT

## 2017-05-30 PROCEDURE — G0151 HHCP-SERV OF PT,EA 15 MIN: HCPCS

## 2017-05-31 PROCEDURE — 3331090002 HH PPS REVENUE DEBIT

## 2017-05-31 PROCEDURE — 3331090001 HH PPS REVENUE CREDIT

## 2018-02-05 ENCOUNTER — HOSPITAL ENCOUNTER (OUTPATIENT)
Dept: ULTRASOUND IMAGING | Age: 83
Discharge: HOME | End: 2018-02-05
Attending: INTERNAL MEDICINE
Payer: MEDICARE

## 2018-02-05 DIAGNOSIS — R32 URINE INCONTINENCE: ICD-10-CM

## 2018-02-05 PROCEDURE — 76857 US EXAM PELVIC LIMITED: CPT

## 2018-04-26 ENCOUNTER — HOME HEALTH ADMISSION (OUTPATIENT)
Dept: HOME HEALTH SERVICES | Facility: HOME HEALTH | Age: 83
End: 2018-04-26
Payer: MEDICARE

## 2018-04-27 ENCOUNTER — HOME CARE VISIT (OUTPATIENT)
Dept: SCHEDULING | Facility: HOME HEALTH | Age: 83
End: 2018-04-27
Payer: MEDICARE

## 2018-04-27 ENCOUNTER — HOME CARE VISIT (OUTPATIENT)
Dept: HOME HEALTH SERVICES | Facility: HOME HEALTH | Age: 83
End: 2018-04-27

## 2018-04-27 VITALS
HEART RATE: 64 BPM | TEMPERATURE: 97.4 F | SYSTOLIC BLOOD PRESSURE: 120 MMHG | DIASTOLIC BLOOD PRESSURE: 62 MMHG | OXYGEN SATURATION: 93 %

## 2018-04-27 PROCEDURE — 3331090002 HH PPS REVENUE DEBIT

## 2018-04-27 PROCEDURE — 400013 HH SOC

## 2018-04-27 PROCEDURE — G0151 HHCP-SERV OF PT,EA 15 MIN: HCPCS

## 2018-04-27 PROCEDURE — 3331090001 HH PPS REVENUE CREDIT

## 2018-04-28 PROCEDURE — 3331090002 HH PPS REVENUE DEBIT

## 2018-04-28 PROCEDURE — 3331090001 HH PPS REVENUE CREDIT

## 2018-04-29 PROCEDURE — 3331090002 HH PPS REVENUE DEBIT

## 2018-04-29 PROCEDURE — 3331090001 HH PPS REVENUE CREDIT

## 2018-04-30 ENCOUNTER — HOME CARE VISIT (OUTPATIENT)
Dept: HOME HEALTH SERVICES | Facility: HOME HEALTH | Age: 83
End: 2018-04-30
Payer: MEDICARE

## 2018-04-30 PROCEDURE — 3331090002 HH PPS REVENUE DEBIT

## 2018-04-30 PROCEDURE — 3331090001 HH PPS REVENUE CREDIT

## 2018-05-01 PROCEDURE — 3331090001 HH PPS REVENUE CREDIT

## 2018-05-01 PROCEDURE — 3331090002 HH PPS REVENUE DEBIT

## 2018-05-02 ENCOUNTER — HOME CARE VISIT (OUTPATIENT)
Dept: SCHEDULING | Facility: HOME HEALTH | Age: 83
End: 2018-05-02
Payer: MEDICARE

## 2018-05-02 VITALS
HEART RATE: 88 BPM | OXYGEN SATURATION: 98 % | TEMPERATURE: 98.8 F | DIASTOLIC BLOOD PRESSURE: 58 MMHG | SYSTOLIC BLOOD PRESSURE: 130 MMHG

## 2018-05-02 PROCEDURE — 3331090001 HH PPS REVENUE CREDIT

## 2018-05-02 PROCEDURE — G0151 HHCP-SERV OF PT,EA 15 MIN: HCPCS

## 2018-05-02 PROCEDURE — 3331090002 HH PPS REVENUE DEBIT

## 2018-05-03 ENCOUNTER — HOME CARE VISIT (OUTPATIENT)
Dept: SCHEDULING | Facility: HOME HEALTH | Age: 83
End: 2018-05-03
Payer: MEDICARE

## 2018-05-03 VITALS
SYSTOLIC BLOOD PRESSURE: 120 MMHG | OXYGEN SATURATION: 99 % | TEMPERATURE: 98.5 F | HEART RATE: 95 BPM | DIASTOLIC BLOOD PRESSURE: 58 MMHG

## 2018-05-03 PROCEDURE — 3331090002 HH PPS REVENUE DEBIT

## 2018-05-03 PROCEDURE — 3331090001 HH PPS REVENUE CREDIT

## 2018-05-03 PROCEDURE — G0151 HHCP-SERV OF PT,EA 15 MIN: HCPCS

## 2018-05-04 ENCOUNTER — HOME CARE VISIT (OUTPATIENT)
Dept: SCHEDULING | Facility: HOME HEALTH | Age: 83
End: 2018-05-04
Payer: MEDICARE

## 2018-05-04 VITALS
TEMPERATURE: 98.8 F | HEART RATE: 80 BPM | SYSTOLIC BLOOD PRESSURE: 125 MMHG | OXYGEN SATURATION: 96 % | DIASTOLIC BLOOD PRESSURE: 60 MMHG

## 2018-05-04 PROCEDURE — 3331090002 HH PPS REVENUE DEBIT

## 2018-05-04 PROCEDURE — 3331090001 HH PPS REVENUE CREDIT

## 2018-05-04 PROCEDURE — G0157 HHC PT ASSISTANT EA 15: HCPCS

## 2018-05-05 PROCEDURE — 3331090001 HH PPS REVENUE CREDIT

## 2018-05-05 PROCEDURE — 3331090002 HH PPS REVENUE DEBIT

## 2018-05-06 PROCEDURE — 3331090002 HH PPS REVENUE DEBIT

## 2018-05-06 PROCEDURE — 3331090001 HH PPS REVENUE CREDIT

## 2018-05-07 ENCOUNTER — HOME CARE VISIT (OUTPATIENT)
Dept: SCHEDULING | Facility: HOME HEALTH | Age: 83
End: 2018-05-07
Payer: MEDICARE

## 2018-05-07 PROCEDURE — 3331090001 HH PPS REVENUE CREDIT

## 2018-05-07 PROCEDURE — 3331090002 HH PPS REVENUE DEBIT

## 2018-05-07 PROCEDURE — G0157 HHC PT ASSISTANT EA 15: HCPCS

## 2018-05-08 VITALS
OXYGEN SATURATION: 97 % | DIASTOLIC BLOOD PRESSURE: 50 MMHG | SYSTOLIC BLOOD PRESSURE: 118 MMHG | TEMPERATURE: 97.7 F | HEART RATE: 68 BPM

## 2018-05-08 PROCEDURE — 3331090002 HH PPS REVENUE DEBIT

## 2018-05-08 PROCEDURE — 3331090001 HH PPS REVENUE CREDIT

## 2018-05-09 ENCOUNTER — HOME CARE VISIT (OUTPATIENT)
Dept: SCHEDULING | Facility: HOME HEALTH | Age: 83
End: 2018-05-09
Payer: MEDICARE

## 2018-05-09 PROCEDURE — 3331090002 HH PPS REVENUE DEBIT

## 2018-05-09 PROCEDURE — 3331090001 HH PPS REVENUE CREDIT

## 2018-05-09 PROCEDURE — G0157 HHC PT ASSISTANT EA 15: HCPCS

## 2018-05-10 ENCOUNTER — HOME CARE VISIT (OUTPATIENT)
Dept: SCHEDULING | Facility: HOME HEALTH | Age: 83
End: 2018-05-10
Payer: MEDICARE

## 2018-05-10 VITALS
OXYGEN SATURATION: 96 % | SYSTOLIC BLOOD PRESSURE: 102 MMHG | HEART RATE: 67 BPM | TEMPERATURE: 98.7 F | DIASTOLIC BLOOD PRESSURE: 50 MMHG

## 2018-05-10 PROCEDURE — 3331090001 HH PPS REVENUE CREDIT

## 2018-05-10 PROCEDURE — 3331090002 HH PPS REVENUE DEBIT

## 2018-05-10 PROCEDURE — G0157 HHC PT ASSISTANT EA 15: HCPCS

## 2018-05-11 VITALS
SYSTOLIC BLOOD PRESSURE: 128 MMHG | HEART RATE: 70 BPM | DIASTOLIC BLOOD PRESSURE: 55 MMHG | TEMPERATURE: 98.7 F | OXYGEN SATURATION: 94 %

## 2018-05-11 PROCEDURE — 3331090002 HH PPS REVENUE DEBIT

## 2018-05-11 PROCEDURE — 3331090001 HH PPS REVENUE CREDIT

## 2018-05-12 PROCEDURE — 3331090001 HH PPS REVENUE CREDIT

## 2018-05-12 PROCEDURE — 3331090002 HH PPS REVENUE DEBIT

## 2018-05-13 PROCEDURE — 3331090001 HH PPS REVENUE CREDIT

## 2018-05-13 PROCEDURE — 3331090002 HH PPS REVENUE DEBIT

## 2018-05-14 PROCEDURE — 3331090002 HH PPS REVENUE DEBIT

## 2018-05-14 PROCEDURE — 3331090001 HH PPS REVENUE CREDIT

## 2018-05-15 ENCOUNTER — HOME CARE VISIT (OUTPATIENT)
Dept: SCHEDULING | Facility: HOME HEALTH | Age: 83
End: 2018-05-15
Payer: MEDICARE

## 2018-05-15 VITALS
HEART RATE: 66 BPM | TEMPERATURE: 98.3 F | OXYGEN SATURATION: 96 % | DIASTOLIC BLOOD PRESSURE: 52 MMHG | SYSTOLIC BLOOD PRESSURE: 110 MMHG

## 2018-05-15 PROCEDURE — G0157 HHC PT ASSISTANT EA 15: HCPCS

## 2018-05-15 PROCEDURE — 3331090001 HH PPS REVENUE CREDIT

## 2018-05-15 PROCEDURE — 3331090002 HH PPS REVENUE DEBIT

## 2018-05-16 PROCEDURE — 3331090002 HH PPS REVENUE DEBIT

## 2018-05-16 PROCEDURE — 3331090001 HH PPS REVENUE CREDIT

## 2018-05-17 ENCOUNTER — HOME CARE VISIT (OUTPATIENT)
Dept: SCHEDULING | Facility: HOME HEALTH | Age: 83
End: 2018-05-17
Payer: MEDICARE

## 2018-05-17 VITALS
SYSTOLIC BLOOD PRESSURE: 127 MMHG | OXYGEN SATURATION: 97 % | HEART RATE: 82 BPM | TEMPERATURE: 98.4 F | DIASTOLIC BLOOD PRESSURE: 60 MMHG

## 2018-05-17 PROCEDURE — 3331090001 HH PPS REVENUE CREDIT

## 2018-05-17 PROCEDURE — 3331090002 HH PPS REVENUE DEBIT

## 2018-05-17 PROCEDURE — G0157 HHC PT ASSISTANT EA 15: HCPCS

## 2018-05-18 PROCEDURE — 3331090001 HH PPS REVENUE CREDIT

## 2018-05-18 PROCEDURE — 3331090002 HH PPS REVENUE DEBIT

## 2018-05-19 PROCEDURE — 3331090001 HH PPS REVENUE CREDIT

## 2018-05-19 PROCEDURE — 3331090002 HH PPS REVENUE DEBIT

## 2018-05-20 PROCEDURE — 3331090002 HH PPS REVENUE DEBIT

## 2018-05-20 PROCEDURE — 3331090001 HH PPS REVENUE CREDIT

## 2018-05-21 PROCEDURE — 3331090001 HH PPS REVENUE CREDIT

## 2018-05-21 PROCEDURE — 3331090002 HH PPS REVENUE DEBIT

## 2018-05-22 ENCOUNTER — HOME CARE VISIT (OUTPATIENT)
Dept: SCHEDULING | Facility: HOME HEALTH | Age: 83
End: 2018-05-22
Payer: MEDICARE

## 2018-05-22 VITALS
HEART RATE: 78 BPM | OXYGEN SATURATION: 98 % | TEMPERATURE: 98 F | DIASTOLIC BLOOD PRESSURE: 50 MMHG | SYSTOLIC BLOOD PRESSURE: 120 MMHG

## 2018-05-22 PROCEDURE — 3331090001 HH PPS REVENUE CREDIT

## 2018-05-22 PROCEDURE — 3331090002 HH PPS REVENUE DEBIT

## 2018-05-22 PROCEDURE — G0151 HHCP-SERV OF PT,EA 15 MIN: HCPCS

## 2018-05-23 PROCEDURE — 3331090002 HH PPS REVENUE DEBIT

## 2018-05-23 PROCEDURE — 3331090001 HH PPS REVENUE CREDIT

## 2018-05-24 PROCEDURE — 3331090002 HH PPS REVENUE DEBIT

## 2018-05-24 PROCEDURE — 3331090001 HH PPS REVENUE CREDIT

## 2018-05-25 PROCEDURE — 3331090002 HH PPS REVENUE DEBIT

## 2018-05-25 PROCEDURE — 3331090001 HH PPS REVENUE CREDIT

## 2018-05-26 PROCEDURE — 3331090001 HH PPS REVENUE CREDIT

## 2018-05-26 PROCEDURE — 3331090002 HH PPS REVENUE DEBIT

## 2018-08-02 ENCOUNTER — HOSPITAL ENCOUNTER (OUTPATIENT)
Dept: CT IMAGING | Age: 83
Discharge: HOME | End: 2018-08-02
Attending: INTERNAL MEDICINE
Payer: MEDICARE

## 2018-08-02 DIAGNOSIS — S09.90XS HEAD TRAUMA, SEQUELA: ICD-10-CM

## 2018-08-02 PROCEDURE — 70450 CT HEAD/BRAIN W/O DYE: CPT

## 2019-01-01 ENCOUNTER — HOSPITAL ENCOUNTER (OUTPATIENT)
Dept: PHYSICAL THERAPY | Age: 84
Discharge: HOME | End: 2019-11-07
Payer: MEDICARE

## 2019-01-01 ENCOUNTER — HOSPITAL ENCOUNTER (OUTPATIENT)
Dept: PHYSICAL THERAPY | Age: 84
Discharge: HOME | End: 2019-11-05
Payer: MEDICARE

## 2019-01-01 ENCOUNTER — HOSPITAL ENCOUNTER (OUTPATIENT)
Dept: LAB | Age: 84
Discharge: HOME | End: 2019-10-29
Payer: MEDICARE

## 2019-01-01 ENCOUNTER — HOME HEALTH ADMISSION (OUTPATIENT)
Dept: HOME HEALTH SERVICES | Facility: HOME HEALTH | Age: 84
End: 2019-01-01
Payer: MEDICARE

## 2019-01-01 ENCOUNTER — APPOINTMENT (OUTPATIENT)
Dept: CT IMAGING | Age: 84
End: 2019-01-01
Attending: EMERGENCY MEDICINE
Payer: MEDICARE

## 2019-01-01 ENCOUNTER — HOSPITAL ENCOUNTER (OUTPATIENT)
Dept: PHYSICAL THERAPY | Age: 84
Discharge: HOME | End: 2019-11-27
Payer: MEDICARE

## 2019-01-01 ENCOUNTER — HOSPITAL ENCOUNTER (OUTPATIENT)
Dept: PHYSICAL THERAPY | Age: 84
Discharge: HOME | End: 2019-11-13
Payer: MEDICARE

## 2019-01-01 ENCOUNTER — APPOINTMENT (OUTPATIENT)
Dept: PHYSICAL THERAPY | Age: 84
End: 2019-01-01
Payer: MEDICARE

## 2019-01-01 ENCOUNTER — HOME CARE VISIT (OUTPATIENT)
Dept: HOME HEALTH SERVICES | Facility: HOME HEALTH | Age: 84
End: 2019-01-01
Payer: MEDICARE

## 2019-01-01 ENCOUNTER — HOSPITAL ENCOUNTER (OUTPATIENT)
Dept: PHYSICAL THERAPY | Age: 84
Discharge: HOME | End: 2019-11-11
Payer: MEDICARE

## 2019-01-01 ENCOUNTER — HOSPITAL ENCOUNTER (OUTPATIENT)
Dept: PHYSICAL THERAPY | Age: 84
Discharge: HOME | End: 2019-10-23
Payer: MEDICARE

## 2019-01-01 ENCOUNTER — HOSPITAL ENCOUNTER (OUTPATIENT)
Dept: PHYSICAL THERAPY | Age: 84
Discharge: HOME | End: 2019-11-25
Payer: MEDICARE

## 2019-01-01 ENCOUNTER — HOSPITAL ENCOUNTER (OUTPATIENT)
Dept: PHYSICAL THERAPY | Age: 84
Discharge: HOME | End: 2019-11-18
Payer: MEDICARE

## 2019-01-01 ENCOUNTER — HOSPITAL ENCOUNTER (OUTPATIENT)
Dept: PHYSICAL THERAPY | Age: 84
Discharge: HOME | End: 2019-11-01
Payer: MEDICARE

## 2019-01-01 ENCOUNTER — HOSPITAL ENCOUNTER (EMERGENCY)
Age: 84
Discharge: HOME | End: 2019-10-29
Attending: EMERGENCY MEDICINE | Admitting: EMERGENCY MEDICINE
Payer: MEDICARE

## 2019-01-01 ENCOUNTER — HOSPITAL ENCOUNTER (OUTPATIENT)
Dept: PHYSICAL THERAPY | Age: 84
Discharge: HOME | End: 2019-11-20
Payer: MEDICARE

## 2019-01-01 ENCOUNTER — HOME CARE VISIT (OUTPATIENT)
Dept: SCHEDULING | Facility: HOME HEALTH | Age: 84
End: 2019-01-01
Payer: MEDICARE

## 2019-01-01 VITALS
TEMPERATURE: 96.9 F | OXYGEN SATURATION: 96 % | HEART RATE: 79 BPM | DIASTOLIC BLOOD PRESSURE: 50 MMHG | SYSTOLIC BLOOD PRESSURE: 114 MMHG

## 2019-01-01 VITALS
HEART RATE: 96 BPM | TEMPERATURE: 98 F | RESPIRATION RATE: 21 BRPM | WEIGHT: 154 LBS | OXYGEN SATURATION: 100 % | SYSTOLIC BLOOD PRESSURE: 135 MMHG | DIASTOLIC BLOOD PRESSURE: 81 MMHG | BODY MASS INDEX: 29.07 KG/M2 | HEIGHT: 61 IN

## 2019-01-01 DIAGNOSIS — K57.92 ACUTE DIVERTICULITIS: Primary | ICD-10-CM

## 2019-01-01 DIAGNOSIS — K80.20 CHOLELITHIASIS WITHOUT CHOLECYSTITIS: ICD-10-CM

## 2019-01-01 DIAGNOSIS — N39.0 ACUTE UTI: ICD-10-CM

## 2019-01-01 DIAGNOSIS — R19.7 DIARRHEA, UNSPECIFIED TYPE: ICD-10-CM

## 2019-01-01 LAB
ALBUMIN SERPL-MCNC: 3.3 G/DL (ref 3.4–5)
ALBUMIN/GLOB SERPL: 0.8 {RATIO} (ref 0.8–1.7)
ALP SERPL-CCNC: 211 U/L (ref 45–117)
ALT SERPL-CCNC: 17 U/L (ref 13–56)
ANION GAP SERPL CALC-SCNC: 3 MMOL/L (ref 3–18)
APPEARANCE UR: CLEAR
AST SERPL-CCNC: 24 U/L (ref 10–38)
BACTERIA SPEC CULT: ABNORMAL
BACTERIA URNS QL MICRO: ABNORMAL /HPF
BASOPHILS # BLD: 0 K/UL (ref 0–0.1)
BASOPHILS NFR BLD: 0 % (ref 0–2)
BILIRUB SERPL-MCNC: 0.3 MG/DL (ref 0.2–1)
BILIRUB UR QL: NEGATIVE
BUN SERPL-MCNC: 22 MG/DL (ref 7–18)
BUN/CREAT SERPL: 17 (ref 12–20)
C DIFF TOX GENS STL QL NAA+PROBE: NEGATIVE
CALCIUM SERPL-MCNC: 8.7 MG/DL (ref 8.5–10.1)
CHLORIDE SERPL-SCNC: 104 MMOL/L (ref 100–111)
CO2 SERPL-SCNC: 26 MMOL/L (ref 21–32)
COLOR UR: YELLOW
CREAT SERPL-MCNC: 1.3 MG/DL (ref 0.6–1.3)
DIFFERENTIAL METHOD BLD: ABNORMAL
EOSINOPHIL # BLD: 0.3 K/UL (ref 0–0.4)
EOSINOPHIL NFR BLD: 4 % (ref 0–5)
EPITH CASTS URNS QL MICRO: ABNORMAL /LPF (ref 0–5)
ERYTHROCYTE [DISTWIDTH] IN BLOOD BY AUTOMATED COUNT: 15.8 % (ref 11.6–14.5)
GLOBULIN SER CALC-MCNC: 4.1 G/DL (ref 2–4)
GLUCOSE SERPL-MCNC: 120 MG/DL (ref 74–99)
GLUCOSE UR STRIP.AUTO-MCNC: NEGATIVE MG/DL
HCT VFR BLD AUTO: 34.1 % (ref 35–45)
HGB BLD-MCNC: 10.8 G/DL (ref 12–16)
HGB UR QL STRIP: ABNORMAL
INTERPRETATION: ABNORMAL
KETONES UR QL STRIP.AUTO: NEGATIVE MG/DL
LEUKOCYTE ESTERASE UR QL STRIP.AUTO: ABNORMAL
LYMPHOCYTES # BLD: 1.7 K/UL (ref 0.9–3.6)
LYMPHOCYTES NFR BLD: 21 % (ref 21–52)
MAGNESIUM SERPL-MCNC: 1.8 MG/DL (ref 1.6–2.6)
MCH RBC QN AUTO: 27.3 PG (ref 24–34)
MCHC RBC AUTO-ENTMCNC: 31.7 G/DL (ref 31–37)
MCV RBC AUTO: 86.3 FL (ref 74–97)
MONOCYTES # BLD: 0.8 K/UL (ref 0.05–1.2)
MONOCYTES NFR BLD: 10 % (ref 3–10)
NEUTS SEG # BLD: 5.1 K/UL (ref 1.8–8)
NEUTS SEG NFR BLD: 65 % (ref 40–73)
NITRITE UR QL STRIP.AUTO: NEGATIVE
PCR REFLEX: ABNORMAL
PH UR STRIP: 5 [PH] (ref 5–8)
PLATELET # BLD AUTO: 296 K/UL (ref 135–420)
PMV BLD AUTO: 8 FL (ref 9.2–11.8)
POTASSIUM SERPL-SCNC: 4.8 MMOL/L (ref 3.5–5.5)
PROT SERPL-MCNC: 7.4 G/DL (ref 6.4–8.2)
PROT UR STRIP-MCNC: ABNORMAL MG/DL
RBC # BLD AUTO: 3.95 M/UL (ref 4.2–5.3)
RBC #/AREA URNS HPF: ABNORMAL /HPF (ref 0–5)
SERVICE CMNT-IMP: ABNORMAL
SODIUM SERPL-SCNC: 133 MMOL/L (ref 136–145)
SP GR UR REFRACTOMETRY: 1.01 (ref 1–1.03)
T4 FREE SERPL-MCNC: 1.4 NG/DL (ref 0.7–1.5)
TSH SERPL DL<=0.05 MIU/L-ACNC: 1.76 UIU/ML (ref 0.36–3.74)
UROBILINOGEN UR QL STRIP.AUTO: 0.2 EU/DL (ref 0.2–1)
WBC # BLD AUTO: 8 K/UL (ref 4.6–13.2)
WBC URNS QL MICRO: ABNORMAL /HPF (ref 0–4)

## 2019-01-01 PROCEDURE — 3331090001 HH PPS REVENUE CREDIT

## 2019-01-01 PROCEDURE — 83735 ASSAY OF MAGNESIUM: CPT

## 2019-01-01 PROCEDURE — 0107U C DIFF TOX AG DETCJ IA STOOL: CPT

## 2019-01-01 PROCEDURE — 97110 THERAPEUTIC EXERCISES: CPT

## 2019-01-01 PROCEDURE — 3331090002 HH PPS REVENUE DEBIT

## 2019-01-01 PROCEDURE — 400013 HH SOC

## 2019-01-01 PROCEDURE — 84439 ASSAY OF FREE THYROXINE: CPT

## 2019-01-01 PROCEDURE — 96365 THER/PROPH/DIAG IV INF INIT: CPT

## 2019-01-01 PROCEDURE — 3331090003 HH PPS REVENUE ADJ

## 2019-01-01 PROCEDURE — 74011636320 HC RX REV CODE- 636/320: Performed by: EMERGENCY MEDICINE

## 2019-01-01 PROCEDURE — 99283 EMERGENCY DEPT VISIT LOW MDM: CPT

## 2019-01-01 PROCEDURE — 87186 SC STD MICRODIL/AGAR DIL: CPT

## 2019-01-01 PROCEDURE — 87086 URINE CULTURE/COLONY COUNT: CPT

## 2019-01-01 PROCEDURE — 96361 HYDRATE IV INFUSION ADD-ON: CPT

## 2019-01-01 PROCEDURE — 97530 THERAPEUTIC ACTIVITIES: CPT

## 2019-01-01 PROCEDURE — 97162 PT EVAL MOD COMPLEX 30 MIN: CPT

## 2019-01-01 PROCEDURE — 80053 COMPREHEN METABOLIC PANEL: CPT

## 2019-01-01 PROCEDURE — 74011250637 HC RX REV CODE- 250/637: Performed by: EMERGENCY MEDICINE

## 2019-01-01 PROCEDURE — 74011000258 HC RX REV CODE- 258: Performed by: EMERGENCY MEDICINE

## 2019-01-01 PROCEDURE — 84443 ASSAY THYROID STIM HORMONE: CPT

## 2019-01-01 PROCEDURE — G0151 HHCP-SERV OF PT,EA 15 MIN: HCPCS

## 2019-01-01 PROCEDURE — 87077 CULTURE AEROBIC IDENTIFY: CPT

## 2019-01-01 PROCEDURE — 97112 NEUROMUSCULAR REEDUCATION: CPT

## 2019-01-01 PROCEDURE — 74011250636 HC RX REV CODE- 250/636: Performed by: EMERGENCY MEDICINE

## 2019-01-01 PROCEDURE — 81001 URINALYSIS AUTO W/SCOPE: CPT

## 2019-01-01 PROCEDURE — 74177 CT ABD & PELVIS W/CONTRAST: CPT

## 2019-01-01 PROCEDURE — 85025 COMPLETE CBC W/AUTO DIFF WBC: CPT

## 2019-01-01 PROCEDURE — 87493 C DIFF AMPLIFIED PROBE: CPT

## 2019-01-01 RX ORDER — METRONIDAZOLE 250 MG/1
500 TABLET ORAL
Status: COMPLETED | OUTPATIENT
Start: 2019-01-01 | End: 2019-01-01

## 2019-01-01 RX ORDER — CEFDINIR 300 MG/1
300 CAPSULE ORAL 2 TIMES DAILY
Qty: 20 CAP | Refills: 0 | Status: SHIPPED | OUTPATIENT
Start: 2019-01-01 | End: 2019-01-01

## 2019-01-01 RX ORDER — METRONIDAZOLE 500 MG/1
500 TABLET ORAL 2 TIMES DAILY
Qty: 20 TAB | Refills: 0 | Status: SHIPPED | OUTPATIENT
Start: 2019-01-01 | End: 2019-01-01

## 2019-01-01 RX ORDER — LOPERAMIDE HCL 2 MG
4 TABLET ORAL
Qty: 30 TAB | Refills: 1 | Status: SHIPPED | OUTPATIENT
Start: 2019-01-01

## 2019-01-01 RX ADMIN — CEFTRIAXONE 1 G: 1 INJECTION, POWDER, FOR SOLUTION INTRAMUSCULAR; INTRAVENOUS at 23:32

## 2019-01-01 RX ADMIN — SODIUM CHLORIDE 1000 ML: 900 INJECTION, SOLUTION INTRAVENOUS at 23:32

## 2019-01-01 RX ADMIN — IOPAMIDOL 80 ML: 755 INJECTION, SOLUTION INTRAVENOUS at 22:35

## 2019-01-01 RX ADMIN — METRONIDAZOLE 500 MG: 250 TABLET ORAL at 23:59

## 2019-10-23 NOTE — PROGRESS NOTES
PHYSICAL THERAPY - DAILY TREATMENT NOTE    Patient Name: Deloris Parada        Date: 10/23/2019  : 10/14/1926   YES Patient  Verified  Visit #:   1     Insurance: Payor: [place] MED[place] / Plan: [place] MEDICARE PART A & B / Product Type: Medicare /      In time: 9:08 Out time: 10:08   Total Treatment Time: 60     Medicare/BCBS [place] Time Tracking (below)   Total Timed Codes (min):  0 1:1 Treatment Time:  0     TREATMENT AREA =  Lower body weakness, gait instability    SUBJECTIVE    Pain Level (on 0 to 10 scale):  2  / 10 Left hip   Medication Changes/New allergies or changes in medical history, any new surgeries or procedures? NO    If yes, update Summary List   Subjective Functional Status/Changes:  []  No changes reported     Pt's daughter reports that pt has been using walker since 2017 due to decreased balance. Pt's daughter reports that pt had bad fall 2019 and had to have extensive stitches. Pt's daughter reports that pt had HHPT and had to go to wound clinic. Pt's daughter reports that fall was caused by furniture/wall walking. Pt's daughter reports that pt has been good about using RW since she fell. Pt's daughter reports that pt has another fall 2019, during which she fell backward and hit head, which was checked out by doctor and was okay. Pt's daughter reports that [facility] 5 days/week from 11-. Pt's daughter reports that pt lives alone in [facility] with 1st floor arrangements, 3 MADHURI with handrail (B HR) (uses RW in front of body and R AHR to ascend/descend stairs). Pt's daughter reports that pt has cane, w/c and elevated toilet seat, but does not use them. Pt's daughter reports that pt has grab bars and shower chair inside tub. Pt reports that she has assistance with bathing. Pt reports that her goal for therapy is to walk better. Pt c/o stiffness, weakness, impaired balance.   Pt reports intermittent dizziness, and reports nausea with riding in car, attempting to draw, attempting to do cross-stitch, attempting to sew. Pt reports occasional nausea with word search. Pt's daughter reports that pt's interests have changed since her spouse passed away, and she no longer does many of the things that she used to do.            OBJECTIVE    Physical Therapy Evaluation - Neurologic    Posture: [] Poor    [x] Fair    [] Good    Describe:   Protracted head/shoulders, increased thoracic kyphosis, decreased lumbar lordosis, posterior pelvic tilt    Gait: [] Normal    [x] Abnormal    Device: RW     Describe: Trendelenburg right LE, decreased step length, decreased gait speed, narrow MARCELO, flexed posture    ROM:                             AROM    PROM   Shoulder Left Right Left Right   Flex Dec Dec     Ext Dec Dec     ABD Dec Dec     ER Dec Dec     IR Dec Dec              AROM    PROM   Knee Left Right Left Right   Ext [facility]     Flex [facility] WFL               AROM                           PROM  Hip Left Right Left Right   Flex [facility] WFL     Ext [facility] WFL     ABD [facility]     ER WFL WFL     IR WFL WFL                                              AROM      PROM   Ankle Left Right Left Right   Ext [facility] WFL     Flex [facility] WFL       Strength (MMT):  Shoulder L (1-5) R (1-5)   Shoulder Flexion 3- 3-   Shoulder Ext 3- 3-   Shoulder ABD 3- 3-   Shoulder ADD 3- 3-   Shoulder IR 3- 3-   Shoulder ER 3- 3-                                             Hip L (1-5) R (1-5)   Hip Flexion 4- (p!) 4- (p!)   Hip Ext NT NT   Hip ABD NT NT   Hip ADD NT NT   Hip ER 3 3   Hip IR 3 3     Knee L (1-5) R (1-5)   Knee Flexion 4- 4-   Knee Extension 4 4   Ankle PF 4 4   Ankle DF 4 4   Other       Tone: WNL    Motor Control: Decreased speed of movements    Sensation: NT - no complaints of altered sensation    Reflexes: [x] Not Tested   Left Right   Biceps (C5)     Brachioradiais (C6)     Triceps (C7)     Knee Jerk (L4)     Ankle Jerk (SI)       Balance/ Equilibrium:              Left            Right  Tracks Across Midline yes  yes   Reaches Across Midline yes yes         Sitting Balance: Static:  [x] Good    [] Fair    [] Poor     Dynamic:   [x] Good    [] Fair    [] Poor        Standing Balance: Static:   [] Good    [] Fair    [x] Poor     Dynamic:   [] Good    [] Fair    [x] Poor        Protective Extension:  [x] Present    [] Delayed    [] Absent        Romber\"/5\"        Functional Mobility      Bed Mobility:      Scooting: NT       Rolling: NT       Sit-Supine: NT      Transfers:       Sit-Stand: B UE assist, S       Floor-Stand: NT      Gait:       Tandem: NT       Backwards: NT       Braiding: NT      Elevations:       Curbs: NT       Ramps: NT       Stairs: NT    Behavior: [x] Cooperative    [] Impulsive    [] Agitated    [] Perseverative    [] Confused   Oriented x: 4    Cognition: [] One Step Commands   [x] Multiple Commands   [] Displays Neglect [] R  [] L    Other:       Impaired Judgement: [] Y    [x] N      Impaired Vision:  [x] Y    [] N      Safety Awareness Deficits  [x] Y    [] N      Impaired Hearing  [x] Y    [] N      Able to Express Needs [x] Y    [] N    Optional Tests:       Dynamic Gait Index (24pt scale): Functional Gait Assessment (30pt scale):       [place] Balance Scale (56pt scale):     Other test /comments: Oculomotor ROM WNL; no spontaneous or gaze nystagmus; smooth pursuit and saccades WNL; VOR with slow head movements WNL; unable to maintain eyes on target with VOR with fast head movements, denies dizziness/nausea  C/S ROM decreased all directions; c/o stiffness but denies dizziness/nausea    During eval min Patient Education:  NO  Reviewed HEP   []  Progressed/Changed HEP based on:   Cont to use RW for safety with ambulation due to increased fall risk     Other Objective/Functional Measures:    See eval     Post Treatment Pain Level (on 0 to 10) scale:   2  / 10     ASSESSMENT    Assessment/Changes in Function:     See plan of care    Justification for Eval Code Complexity:  Patient History : HIGH - heart disease, MI, depression, anxiety, osteoporosis, arthritis, thyroid disorder, HTN, visual impairment, hearing impairment, asthma, breast cancer - s/p mastectomy, right UE lymphedema, B TKR, B cataract surgery, let hip pain, UTI  Examination HIGH - See objective  Clinical Presentation: MEDIUM  Clinical Decision Making : MEDIUM - FOTO 40/100       []  See Progress Note/Recertification   Patient will continue to benefit from skilled PT services: see plan of care   Progress toward goals / Updated goals:    See plan of care     PLAN    [x]  Upgrade activities as tolerated YES Continue plan of care   []  Discharge due to :    []  Other:      Therapist: Mariann Ormond, PT    Date: 10/23/2019 Time: 9:12 AM

## 2019-10-23 NOTE — PROGRESS NOTES
[facility] PHYSICAL THERAPY  [address] [place] [address] Rob Arana, [address] - Phone: [phone]  Fax: [phone] / [address] [facility]  Patient Name: Collins Morrow : 10/14/1926   Medical   Diagnosis: Gait instability [R26.81] Treatment Diagnosis: Gait instability [R26.81]   Onset Date: Chronic with recent worsening     Referral Source: Deja Prasad MD [facility]): 10/23/2019   Prior Hospitalization: See medical history Provider #: [id number]   Prior Level of Function: Ambulating with RW since 2017; multiple falls; living alone with [facility] assistance 6 hours/day, 5 days/week   Comorbidities: Heart disease, MI, depression, anxiety, osteoporosis, arthritis, thyroid disorder, HTN, visual impairment, hearing impairment, asthma, breast cancer, right mastectomy, lymphedema right UE, B TKR, B cataract surgery, UTI, left hip pain   Medications: Verified on Patient Summary List   The Plan of Care and following information is based on the information from the initial evaluation.   ===========================================================================================  Assessment / key information:  Patient is a 80 y.o. female who presents with complaints of impaired balance/gait with multiple falls, as well as muscle stiffness/weakness and intermittent dizziness/nausea with movements and visual stimulation. Patient demonstrates impaired posture, decreased C/S ROM, decreased B shoulder ROM/strength, decreased B LE strength, decreased speed of movements, decreased balance (Romberg = 30\" eyes open/5\" eyes closed), difficulty with VOR and impaired functional mobility/gait (requires B UE assist for sit to stand, ambulates with Trendelenburg right LE, decreased step length, decreased gait speed, narrow MARCELO, flexed posture). Patient would benefit from skilled PT services to address these issues and improve function. Thank you for this referral.    FOTO: 40/100 (severe)  ==========================================================================================  Eval Complexity: History: HIGH Complexity :3+ comorbidities / personal factors will impact the outcome/ POC Exam:HIGH Complexity : 4+ Standardized tests and measures addressing body structure, function, activity limitation and / or participation in recreation  Presentation: MEDIUM Complexity : Evolving with changing characteristics  Clinical Decision Making:MEDIUM Complexity : FOTO score of 26-74Overall Complexity:MEDIUM    Problem List: pain affecting function, decrease ROM, decrease strength, impaired gait/ balance, decrease ADL/ functional abilitiies, decrease activity tolerance, decrease flexibility/ joint mobility and decrease transfer abilities   Treatment Plan may include any combination of the following: Therapeutic exercise, Therapeutic activities, Neuromuscular re-education, Physical agent/modality, Gait/balance training, Manual therapy, Patient education, Functional mobility training, Home safety training and Stair training  Patient / Family readiness to learn indicated by: asking questions, trying to perform skills and interest  Persons(s) to be included in education: patient (P) and family support person (FSP);list daughter  Barriers to Learning/Limitations: yes;  sensory deficits-vision/hearing/speech  Measures taken:    Patient Goal (s): \"Better balance. \"   Patient self reported health status: fair  Rehabilitation Potential: good   Short Term Goals: To be accomplished in  2-4  weeks:  1. Patient will demonstrate compliance with HEP. 2. Patient will complete Tinetti or DGI for further assessment of balance/functional mobility/gait. 3. Patient will maintain Romberg eyes closed 30\" to increase safety in challenging environments.  Long Term Goals: To be accomplished in  4-8  weeks:  1. Patient will demonstrate independence with HEP.   2. Patient will demonstrate 4 point improvement on Tinetti or DGI to indicate improved balance/functional mobility/gait. 3. Patient will score greater than or equal to 50/100 on FOTO to indicate improved function. Frequency / Duration:   Patient to be seen  2  times per week for 4-8  weeks:  Patient / Caregiver education and instruction: activity modification    Therapist Signature: Mart Lind PT Date: 56/85/9052   Certification Period: 10/23/2019-1/22/2020 Time: 9:12 AM   ===========================================================================================  I certify that the above Physical Therapy Services are being furnished while the patient is under my care. I agree with the treatment plan and certify that this therapy is necessary. Physician Signature:        Date:       Time:     Please sign and return to [facility] or you may fax the signed copy to [phone]. Thank you.

## 2019-10-28 NOTE — ED TRIAGE NOTES
Patient brought in by daughter for abnormal stool for the last three weeks (diarreha possibly but she states she does not know.)  Per daughter patient lives alone and has not been eating, only drinking water and ensure and has been taking milk of magnesia. Daughter speaks for patient and will not allow patient to speak for herself in triage. Daughter states she is POA but no history of dementia. Daughter states that she picked up a prescription and lab work today but came here because she felt like the patient was being taken care properly by PCP. Patient voices no concerns and denies pain.

## 2019-10-29 NOTE — ED PROVIDER NOTES
Andree Pérez is a 80 y.o. Female with history of deafness and hypertension along with hypothyroidism with worsening diarrhea over the last couple weeks. Patient was placed on antibiotics for UTI just prior and is been having intermittent severe watery explosive diarrhea for the last couple weeks. There is been no blood. Patient has not been vomiting. She does not really complain of any abdominal pain. Patient was referred from her PCP for further testing here. Per the daughter she was given a test kit for C. difficile but has not obtained a stool sample at home yet. Patient denies any pain here. The history is provided by the patient (daughter). Past Medical History:  
Diagnosis Date  Acid reflux  Acute MI ([facility] [address]   
 1996  Arthritis  Asthma  [facility]  
 Hypertension  Hypothyroidism Past Surgical History:  
Procedure Laterality Date  HX HERNIA REPAIR    
 HX HYSTERECTOMY  HX KNEE REPLACEMENT    
 bilateral  
 HX LYMPHADENECTOMY    
 right arm  HX MASTECTOMY    
 bilateral  
 HX OTHER SURGICAL    
 lacrimal duct plugs  HX TONSIL AND ADENOIDECTOMY No family history on file. Social History Socioeconomic History  Marital status:  Spouse name: Not on file  Number of children: Not on file  Years of education: Not on file  Highest education level: Not on file Occupational History  Not on file Social Needs  Financial resource strain: Not on file  Food insecurity:  
  Worry: Not on file Inability: Not on file  Transportation needs:  
  Medical: Not on file Non-medical: Not on file Tobacco Use  Smoking status: Never Smoker Substance and Sexual Activity  Alcohol use: No  
 Drug use: No  
 Sexual activity: Never Lifestyle  Physical activity:  
  Days per week: Not on file Minutes per session: Not on file  Stress: Not on file Relationships  Social connections:  
  Talks on phone: Not on file Gets together: Not on file Attends Anabaptist service: Not on file Active member of club or organization: Not on file Attends meetings of clubs or organizations: Not on file Relationship status: Not on file  Intimate partner violence:  
  Fear of current or ex partner: Not on file Emotionally abused: Not on file Physically abused: Not on file Forced sexual activity: Not on file Other Topics Concern  Not on file Social History Narrative  Not on file ALLERGIES: Olegario-1 and Morphine Review of Systems Constitutional: Positive for appetite change, fatigue and unexpected weight change (Loss of weight since June). Negative for fever. HENT: Negative for sore throat and trouble swallowing. Eyes: Negative for visual disturbance. Respiratory: Negative for shortness of breath. Cardiovascular: Negative for chest pain. Gastrointestinal: Positive for diarrhea. Negative for abdominal pain. Endocrine: Negative for polyuria. Genitourinary: Negative for difficulty urinating and dysuria. Musculoskeletal: Positive for gait problem. Skin: Positive for wound. Previous right knee incision is well-healed fall couple months back. Allergic/Immunologic: Negative for immunocompromised state. Neurological: Positive for light-headedness. Negative for syncope. Psychiatric/Behavioral: Positive for sleep disturbance. Vitals:  
 10/28/19 2150 10/28/19 2200 10/28/19 2326 10/28/19 2330 BP:  116/59 153/85 (!) 155/98 Pulse: 82 83 97 98 Resp: 21 17 29 19 Temp:      
SpO2: 98% 99% 100% 99% Weight:      
Height:      
      
 
Physical Exam  
Constitutional: She is oriented to person, place, and time. She appears well-developed and well-nourished. Non-toxic appearance. She does not have a sickly appearance. She does not appear ill. No distress. HENT:  
Head: Normocephalic and atraumatic. Right Ear: External ear normal.  
Left Ear: External ear normal.  
Nose: Nose normal.  
Mouth/Throat: Uvula is midline, oropharynx is clear and moist and mucous membranes are normal.  
Eyes: Conjunctivae are normal. No scleral icterus. Neck: Neck supple. Cardiovascular: Normal rate, regular rhythm, normal heart sounds and intact distal pulses. Pulmonary/Chest: Effort normal and breath sounds normal.  
Abdominal: Soft. She exhibits no distension. There is no tenderness. There is no guarding. Musculoskeletal: She exhibits no edema. Neurological: She is alert and oriented to person, place, and time. Gait normal.  
Skin: Skin is warm and dry. Capillary refill takes less than 2 seconds. She is not diaphoretic. Psychiatric: Her behavior is normal.  
Nursing note and vitals reviewed. MDM Procedures Vitals: 
Patient Vitals for the past 12 hrs: 
 Temp Pulse Resp BP SpO2  
10/28/19 2330  98 19 (!) 155/98 99 % 10/28/19 2326  97 29 153/85 100 % 10/28/19 2200  83 17 116/59 99 % 10/28/19 2150  82 21  98 % 10/28/19 2140  83 19  99 % 10/28/19 2130  83 18 115/46 100 % 10/28/19 1954 98 °F (36.7 °C) 80 16 122/60 98 % Medications ordered:  
Medications  
sodium chloride 0.9 % bolus infusion 1,000 mL (1,000 mL IntraVENous New Bag 10/28/19 2332) cefTRIAXone (ROCEPHIN) 1 g in 0.9% sodium chloride (MBP/ADV) 50 mL MBP (1 g IntraVENous New Bag 10/28/19 2332) metroNIDAZOLE (FLAGYL) tablet 500 mg (has no administration in time range) iopamidol (ISOVUE-370) 76 % injection 80 mL (80 mL IntraVENous Given 10/28/19 2235) Lab findings: 
Recent Results (from the past 12 hour(s)) CBC WITH AUTOMATED DIFF Collection Time: 10/28/19  9:02 PM  
Result Value Ref Range WBC 8.0 4.6 - 13.2 K/uL  
 RBC 3.95 (L) 4.20 - 5.30 M/uL  
 HGB 10.8 (L) 12.0 - 16.0 g/dL HCT 34.1 (L) 35.0 - 45.0 % MCV 86.3 74.0 - 97.0 FL  
 MCH 27.3 24.0 - 34.0 PG  
 MCHC 31.7 31.0 - 37.0 g/dL RDW 15.8 (H) 11.6 - 14.5 % PLATELET 847 374 - 310 K/uL MPV 8.0 (L) 9.2 - 11.8 FL  
 NEUTROPHILS 65 40 - 73 % LYMPHOCYTES 21 21 - 52 % MONOCYTES 10 3 - 10 % EOSINOPHILS 4 0 - 5 % BASOPHILS 0 0 - 2 %  
 ABS. NEUTROPHILS 5.1 1.8 - 8.0 K/UL  
 ABS. LYMPHOCYTES 1.7 0.9 - 3.6 K/UL  
 ABS. MONOCYTES 0.8 0.05 - 1.2 K/UL  
 ABS. EOSINOPHILS 0.3 0.0 - 0.4 K/UL  
 ABS. BASOPHILS 0.0 0.0 - 0.1 K/UL  
 DF AUTOMATED METABOLIC PANEL, COMPREHENSIVE Collection Time: 10/28/19  9:02 PM  
Result Value Ref Range Sodium 133 (L) 136 - 145 mmol/L Potassium 4.8 3.5 - 5.5 mmol/L Chloride 104 100 - 111 mmol/L  
 CO2 26 21 - 32 mmol/L Anion gap 3 3.0 - 18 mmol/L Glucose 120 (H) 74 - 99 mg/dL BUN 22 (H) 7.0 - 18 MG/DL Creatinine 1.30 0.6 - 1.3 MG/DL  
 BUN/Creatinine ratio 17 12 - 20 GFR est AA 46 (L) >60 ml/min/1.73m2 GFR est non-AA 38 (L) >60 ml/min/1.73m2 Calcium 8.7 8.5 - 10.1 MG/DL Bilirubin, total 0.3 0.2 - 1.0 MG/DL  
 ALT (SGPT) 17 13 - 56 U/L  
 AST (SGOT) 24 10 - 38 U/L Alk. phosphatase 211 (H) 45 - 117 U/L Protein, total 7.4 6.4 - 8.2 g/dL Albumin 3.3 (L) 3.4 - 5.0 g/dL Globulin 4.1 (H) 2.0 - 4.0 g/dL A-G Ratio 0.8 0.8 - 1.7 MAGNESIUM Collection Time: 10/28/19  9:02 PM  
Result Value Ref Range Magnesium 1.8 1.6 - 2.6 mg/dL TSH 3RD GENERATION Collection Time: 10/28/19  9:02 PM  
Result Value Ref Range TSH 1.76 0.36 - 3.74 uIU/mL URINALYSIS W/ RFLX MICROSCOPIC Collection Time: 10/28/19  9:46 PM  
Result Value Ref Range Color YELLOW Appearance CLEAR Specific gravity 1.015 1.005 - 1.030    
 pH (UA) 5.0 5.0 - 8.0 Protein TRACE (A) NEG mg/dL Glucose NEGATIVE  NEG mg/dL Ketone NEGATIVE  NEG mg/dL Bilirubin NEGATIVE  NEG Blood TRACE (A) NEG Urobilinogen 0.2 0.2 - 1.0 EU/dL Nitrites NEGATIVE  NEG Leukocyte Esterase MODERATE (A) NEG URINE MICROSCOPIC ONLY  Collection Time: 10/28/19  9:46 PM  
 Result Value Ref Range WBC 11 to 20 0 - 4 /hpf  
 RBC 0 to 2 0 - 5 /hpf Epithelial cells FEW 0 - 5 /lpf Bacteria 3+ (A) NEG /hpf EKG interpretation by ED Physician: X-Ray, CT or other radiology findings or impressions: 
CT ABD PELV W CONT    (Results Pending) Diverticulitis with superimposed low-grade diverticulitis. Chronic tree-in-bud opacities involving the right middle lobe right lower lobe and lingula. Cholelithiasis. Progress notes, Consult notes or additional Procedure notes:  
Patient unable to provide stool sample here. Daughter given cup to get one at home to provide for Dr. Joni Roman for testing. Will place patient on antibiotics for her UTI diverticulitis. Do not feel she requires admission or other work-up at this time. Patient with no right upper quadrant pain to do not feel her cholelithiasis is resulting in her pain. I have discussed with patient and/or family/sig other the results, interpretation of any imaging if performed, suspected diagnosis and treatment plan to include instructions regarding the diagnoses listed to which understanding was expressed with all questions answered Reevaluation of patient:  
stable Disposition: 
Diagnosis: 1. Acute diverticulitis 2. Acute UTI 3. Diarrhea, unspecified type 4. Cholelithiasis without cholecystitis Disposition: home Follow-up Information Follow up With Specialties Details Why Contact Info Faby Hernandez MD Geriatric Medicine Schedule an appointment as soon as possible for a visit  [address] [facility] [address] [facility] [phone] [id number] [phone] [facility] EMERGENCY DEPT Emergency Medicine  If symptoms worsen [address] 
[phone] Patient's Medications Start Taking CEFDINIR (OMNICEF) 300 MG CAPSULE    Take 1 Cap by mouth two (2) times a day for 10 days.   
 LOPERAMIDE (IMODIUM A-D) 2 MG TABLET    Take 2 Tabs by mouth four (4) times daily as needed for Diarrhea. METRONIDAZOLE (FLAGYL) 500 MG TABLET    Take 1 Tab by mouth two (2) times a day for 10 days. Continue Taking ACETAMINOPHEN (TYLENOL) 500 MG TABLET    Take 1,000 mg by mouth every six (6) hours as needed for Fever or Pain. AMLODIPINE (NORVASC) 5 MG TABLET    Take 5 mg by mouth daily. Indications: hypertension ASPIRIN 81 MG TABLET    Take 81 mg by mouth daily. ASPIRIN DELAYED-RELEASE 81 MG TABLET    Take 81 mg by mouth daily. ATORVASTATIN (LIPITOR) 20 MG TABLET    Take 20 mg by mouth daily. CALCIUM CARBONATE-VIT D3-MIN (CALTRATE 600+D PLUS MINERALS) 600 MG CALCIUM- 400 UNIT TAB    Take 1 Tab by mouth daily. CYANOCOBALAMIN (VITAMIN B12) 100 MCG TABLET    Take 1,000 mcg by mouth daily. ESOMEPRAZOLE (NEXIUM) 40 MG CAPSULE    Take 40 mg by mouth every other day. FERROUS SULFATE 325 MG (65 MG IRON) TABLET    Take 325 mg by mouth daily. LEVOTHYROXINE (SYNTHROID) 100 MCG TABLET    Take 100 mcg by mouth Daily (before breakfast). LIDOCAINE (LIDODERM) 5 %    Apply patch to the affected area for 12 hours a day and remove for 12 hours a day. MAGNESIUM HYDROXIDE ([employer] MILK OF MAGNESIA) 400 MG/5 ML SUSPENSION    Take 30 mL by mouth daily. MELATONIN 10 MG TAB    Take 10 mg by mouth nightly. MELOXICAM (MOBIC) 7.5 MG TABLET    Take 7.5 mg by mouth DIALYSIS MON, WED & FRI. METOPROLOL SUCCINATE (TOPROL-XL) 50 MG XL TABLET    Take 1 Tab by mouth daily. MIRTAZAPINE (REMERON) 30 MG TABLET    Take 1 Tab by mouth nightly. MONTELUKAST (SINGULAIR) 10 MG TABLET    Take 10 mg by mouth daily. OMEPRAZOLE DELAYED RELEASE (PRILOSEC D/R) 20 MG TABLET    Take 20 mg by mouth daily. TOLTERODINE ER (DETROL-LA) 2 MG ER CAPSULE    Take 2 mg by mouth daily. These Medications have changed No medications on file Stop Taking AMOXICILLIN (AMOXIL) 500 MG CAPSULE    Take 500 mg by mouth two (2) times a day. CEPHALEXIN (KEFLEX) 250 MG CAPSULE    Take 2 Caps by mouth three (3) times daily. TRAMADOL (ULTRAM) 50 MG TABLET    Take 1 Tab by mouth two (2) times daily as needed. Max Daily Amount: 100 mg.

## 2019-10-29 NOTE — ED NOTES
Assumed care of patient who reports diarrhea x 3 weeks. Pt states she is unable to control BMs. Daughter at bedside reports patient has a HHA who helps daily with ADLs and reported patient having BM all over the walls in her home today.  
  
Pt denies pain, denies any symptoms at this moment. Reports history of double mastectomy, right side due to cancer, and left side due to cyst.  Lymphedema noted in Right arm, no needle sticks or BP measurements on right side.  
  
  
Pt placed on cardiac monitor, NSR noted. 25 gauge PIC placed in LAC, blood sample obtained and sent to lab as ordered.   Pt agrees to straight cath for urine sample, will follow through with order, will continue to monitor pt.

## 2019-10-29 NOTE — DISCHARGE INSTRUCTIONS
Patient Education        Diarrhea: Care Instructions  Your Care Instructions    Diarrhea is loose, watery stools (bowel movements). The exact cause is often hard to find. Sometimes diarrhea is your body's way of getting rid of what caused an upset stomach. Viruses, food poisoning, and many medicines can cause diarrhea. Some people get diarrhea in response to emotional stress, anxiety, or certain foods. Almost everyone has diarrhea now and then. It usually isn't serious, and your stools will return to normal soon. The important thing to do is replace the fluids you have lost, so you can prevent dehydration. The doctor has checked you carefully, but problems can develop later. If you notice any problems or new symptoms, get medical treatment right away. Follow-up care is a key part of your treatment and safety. Be sure to make and go to all appointments, and call your doctor if you are having problems. It's also a good idea to know your test results and keep a list of the medicines you take. How can you care for yourself at home? · Watch for signs of dehydration, which means your body has lost too much water. Dehydration is a serious condition and should be treated right away. Signs of dehydration are:  ? Increasing thirst and dry eyes and mouth. ? Feeling faint or lightheaded. ? A smaller amount of urine than normal.  · To prevent dehydration, drink plenty of fluids. Choose water and other caffeine-free clear liquids until you feel better. If you have kidney, heart, or liver disease and have to limit fluids, talk with your doctor before you increase the amount of fluids you drink. · Begin eating small amounts of mild foods the next day, if you feel like it. ? Try yogurt that has live cultures of Lactobacillus. (Check the label.)  ? Avoid spicy foods, fruits, alcohol, and caffeine until 48 hours after all symptoms are gone. ? Avoid chewing gum that contains sorbitol. ?  Avoid dairy products (except for yogurt with Lactobacillus) while you have diarrhea and for 3 days after symptoms are gone. · The doctor may recommend that you take over-the-counter medicine, such as loperamide (Imodium), if you still have diarrhea after 6 hours. Read and follow all instructions on the label. Do not use this medicine if you have bloody diarrhea, a high fever, or other signs of serious illness. Call your doctor if you think you are having a problem with your medicine. When should you call for help? Call 911 anytime you think you may need emergency care. For example, call if:    · You passed out (lost consciousness).     · Your stools are maroon or very bloody.    Call your doctor now or seek immediate medical care if:    · You are dizzy or lightheaded, or you feel like you may faint.     · Your stools are black and look like tar, or they have streaks of blood.     · You have new or worse belly pain.     · You have symptoms of dehydration, such as:  ? Dry eyes and a dry mouth. ? Passing only a little dark urine. ? Feeling thirstier than usual.     · You have a new or higher fever.    Watch closely for changes in your health, and be sure to contact your doctor if:    · Your diarrhea is getting worse.     · You see pus in the diarrhea.     · You are not getting better after 2 days (48 hours). Where can you learn more? Go to http://ana-darya.info/. Enter J685 in the search box to learn more about \"Diarrhea: Care Instructions. \"  Current as of: June 26, 2019  Content Version: 12.2  © 9681-6524 Healthwise, Incorporated. Care instructions adapted under license by [employer] (which disclaims liability or warranty for this information). If you have questions about a medical condition or this instruction, always ask your healthcare professional. [place] [address] any warranty or liability for your use of this information.          Patient Education        Diverticulitis: Care Instructions  Your Care Instructions    Diverticulitis occurs when pouches form in the wall of the colon and become inflamed or infected. It can be very painful. Doctors aren't sure what causes diverticulitis. There is no proof that foods such as nuts, seeds, or berries cause it or make it worse. A low-fiber diet may cause the colon to work harder to push stool forward. Pouches may form because of this extra work. It may be hard to think about healthy eating while you're in pain. But as you recover, you might think about how you can use healthy eating for overall better health. Healthy eating may help you avoid future attacks. Follow-up care is a key part of your treatment and safety. Be sure to make and go to all appointments, and call your doctor if you are having problems. It's also a good idea to know your test results and keep a list of the medicines you take. How can you care for yourself at home? · Drink plenty of fluids, enough so that your urine is light yellow or clear like water. If you have kidney, heart, or liver disease and have to limit fluids, talk with your doctor before you increase the amount of fluids you drink. · Stick to liquids or a bland diet (plain rice, bananas, dry toast or crackers, applesauce) until you are feeling better. Then you can return to regular foods and gradually increase the amount of fiber in your diet. · Use a heating pad set on low on your belly to relieve mild cramps and pain. · Get extra rest until you are feeling better. · Be safe with medicines. Read and follow all instructions on the label. ? If the doctor gave you a prescription medicine for pain, take it as prescribed. ? If you are not taking a prescription pain medicine, ask your doctor if you can take an over-the-counter medicine. · If your doctor prescribed antibiotics, take them as directed. Do not stop taking them just because you feel better. You need to take the full course of antibiotics.   To prevent future attacks of diverticulitis  · Avoid constipation:  ? Include fruits, vegetables, beans, and whole grains in your diet each day. These foods are high in fiber. ? Drink plenty of fluids, enough so that your urine is light yellow or clear like water. If you have kidney, heart, or liver disease and have to limit fluids, talk with your doctor before you increase the amount of fluids you drink. ? Get some exercise every day. Build up slowly to 30 to 60 minutes a day on 5 or more days of the week. ? Take a fiber supplement, such as Citrucel or Metamucil, every day if needed. Read and follow all instructions on the label. ? Schedule time each day for a bowel movement. Having a daily routine may help. Take your time and do not strain when having a bowel movement. When should you call for help? Call your doctor now or seek immediate medical care if:    · You have a fever.     · You are vomiting.     · You have new or worse belly pain.     · You cannot pass stools or gas.    Watch closely for changes in your health, and be sure to contact your doctor if you have any problems. Where can you learn more? Go to http://ana-darya.info/. Enter H901 in the search box to learn more about \"Diverticulitis: Care Instructions. \"  Current as of: November 7, 2018  Content Version: 12.2  © 8083-8165 Healthwise, Incorporated. Care instructions adapted under license by [employer] (which disclaims liability or warranty for this information). If you have questions about a medical condition or this instruction, always ask your healthcare professional. [place] [address] any warranty or liability for your use of this information.

## 2019-10-29 NOTE — ED NOTES
IV Rocephin administered as ordered via PIV in [address] [place] [address], tolerated well by pt, no adverse reactions noted, will continue to monitor pt.

## 2019-10-29 NOTE — ED NOTES
Urine sample obtained via straight cath, tolerated well by pt, specimen sent to lab as ordered, will continue to monitor pt.

## 2019-10-29 NOTE — ED NOTES
I have reviewed discharge instructions with the patient. The patient verbalized understanding. Pt discharged from ED via wheelchair, stable no distress noted.

## 2019-11-01 NOTE — PROGRESS NOTES
PHYSICAL THERAPY - DAILY TREATMENT NOTE    Patient Name: Alida Nance        Date: 2019  : 10/14/1926   YES Patient  Verified  Visit #:   2     Insurance: Payor: Jaun Perkins / Plan: [place] MEDICARE PART A & B / Product Type: Medicare /      In time: 11:24 Out time: 12:01   Total Treatment Time: 37     Medicare/BCBS [place] Time Tracking (below)   Total Timed Codes (min):  37 1:1 Treatment Time:  37     TREATMENT AREA =  Lower body weakness, gait instability    SUBJECTIVE    Pain Level (on 0 to 10 scale):  0  / 10   Medication Changes/New allergies or changes in medical history, any new surgeries or procedures?    no    If yes, update Summary List   Subjective Functional Status/Changes:  []  No changes reported     Patient's caregiver reports patient leaves walker sometimes and walks holding onto furniture. OBJECTIVE    15 min Therapeutic Exercise:  [x]  See flow sheet   Rationale:      increase ROM and increase strength to improve the patients ability to perform ADL's, gait, and functional mobility with increased safety and independence. 12 min Therapeutic Activity: Stair negotiation and safe stand to sit transfers   Rationale:      increase ROM, increase strength, improve coordination, improve balance and increase proprioception to improve the patients ability to perform stair negotiation and stand to sit transfers safely. 10 min Neuromuscular Re-ed: EO/EC balance exercises   Rationale:      improve coordination, improve balance and increase proprioception to improve the patients ability to perform ADL's, gait, and functional mobility with increased safety and independence. min Patient Education:  YES  Reviewed HEP   []  Progressed/Changed HEP based on:   Advised patient to use RW at all times. Educated patient and caregiver on safe ambulation to chair, turning with RW so back is to chair and sitting down safely.   Also educated patient and caregiver on safe stair negotiation - advised not to use RW on steps; advised to use B UE's on 1 hand rail and negotiate step sideways with a step to gait pattern. Other Objective/Functional Measures:    EO ROM: 30\"   EO MSR(instep): 30\" B (+ sway)  EC stance: 30\"    Patient negotiated 3 steps in clinic as follows: Patient ascended steps placing front wheels of RW on step then placing left hand on rail (right hand remains on RW). She uses a step to gait pattern and advances the RW with each step. She began descending steps placing back 2 legs of RW on lower step with right hand on rail and left hand holding RW. PT then advised patient that this method was unsafe and advised her to turn to the right, hold onto right railing (as this is the railing she uses at home) with B hands, and use a step to gait pattern to descend steps. Caregiver did not observe this but safe stair negotiation was described to caregiver. Patient demonstrates unsafe stand to sit transfer - she fails to turn herself and walker completely around and she lunges for chair. Educated patient on caregiver to walk to chair, use walker to turn completely so back of legs are touching chair, and then reach back with arms to hold onto armrests of chair and sit down. Tinetti = 10/28  10 Meter Walk Test = 30\" which correlates to a gait speed of 0.333 m/sec and indicates she is a household ambuator. Post Treatment Pain Level (on 0 to 10) scale:   0  / 10     ASSESSMENT    Assessment/Changes in Function:     Patient required a seated therapeutic rest break following stair negotiation.       []  See Progress Note/Recertification   Patient will continue to benefit from skilled PT services to modify and progress therapeutic interventions, address functional mobility deficits, address ROM deficits, address strength deficits, analyze and address soft tissue restrictions, analyze and cue movement patterns, analyze and modify body mechanics/ergonomics, assess and modify postural abnormalities, address imbalance/dizziness and instruct in home and community integration to attain remaining goals. Progress toward goals / Updated goals: · Short Term Goals: To be accomplished in  2-4  weeks:  1. Patient will demonstrate compliance with HEP. Issued HEP per handout  2. Patient will complete Tinetti or DGI for further assessment of balance/functional mobility/gait. MET (Tinetti = 10/28)  3. Patient will maintain Romberg eyes closed 30\" to increase safety in challenging environments.  EC stance = 30\"     PLAN    [x]  Upgrade activities as tolerated YES Continue plan of care   []  Discharge due to :    []  Other:      Therapist: Luis Eduardo Morrow PT    Date: 11/1/2019 Time: 11:26 AM     Future Appointments   Date Time Provider Jean Ragland   11/1/2019 11:30 AM Marisa Swift [facility]   11/5/2019  1:30 PM Son Ayers [facility]   11/7/2019  1:30 PM Marisa Swift [address] [place] [address]   11/11/2019  2:30 PM Marisa Swift [facility]   11/13/2019  1:00 PM Marisa Swift [facility]   11/18/2019  1:00 PM Son Ayers[address]   11/20/2019  1:30 PM Son Ayers[address]   11/25/2019  1:00 PM Marisa Swift [address] [place] [address]   11/27/2019  2:30 PM Son Ayers [facility]

## 2019-11-05 NOTE — PROGRESS NOTES
PHYSICAL THERAPY - DAILY TREATMENT NOTE    Patient Name: Mohini Jordan        Date: 2019  : 10/14/1926   YES Patient  Verified  Visit #:   3     Insurance: Payor: [place] MEDICARE / Plan: VA MEDICARE PART A & B / Product Type: Medicare /      In time: 1:20 Out time: 1:57   Total Treatment Time: 37     Medicare/BCBS [place] Time Tracking (below)   Total Timed Codes (min):  37 1:1 Treatment Time:  37     TREATMENT AREA =  Gait instability [R26.81]  SUBJECTIVE    Pain Level (on 0 to 10 scale):  0  / 10   Medication Changes/New allergies or changes in medical history, any new surgeries or procedures? NO    If yes, update Summary List   Subjective Functional Status/Changes:  []  No changes reported     Pt reports no pain. Pt reports that her caregiver is out of town due to death in family, her daughter is with her today. Pt's daughter reports that they went to ER last Monday (10/28/2019) because pt was having diarrhea. Pt's daughter reports that pt was not admitted, was given IV antibiotics and was prescribed oral antibiotic (Flagyl). Pt's daughter reports that she was also instructed to take Imodium. Pt's daughter reports that CT of abdomen revealed diverticulitis and UTI. Pt's daughter reports that she has spoken with Dr. Tesfaye Granados' nurse, who told her that pt does not need to f/u with Dr. Tesfaye Granados if issues have resolved. Pt's daughter reports that issues have resolved. OBJECTIVE    37 min Therapeutic Exercise:  [x]  See flow sheet   Rationale:      increase ROM, increase strength, improve coordination, improve balance and increase proprioception to improve the patients ability to perform ADLs/IADLs, functional mobility and gait safely and independently without increased pain/symptoms     During TE min Patient Education:  YES  Reviewed HEP   [x]  Progressed/Changed HEP based on:    Added LE strengthening ex; reviewed with pt and pt's daughter     Other Objective/Functional Measures:    Exercises per flowsheet   Frequent seated therapeutic rest breaks provided  Trendelenburg noted during left stance phase of gait, resulting in occasional scissoring of right LE in front of left LE - also noted during left stance with stand hip flex     Post Treatment Pain Level (on 0 to 10) scale:   0  / 10     ASSESSMENT    Assessment/Changes in Function:     Tolerated exercises with c/o fatigue, but no pain     []  See Progress Note/Recertification   Patient will continue to benefit from skilled PT services to modify and progress therapeutic interventions, address functional mobility deficits, address ROM deficits, address strength deficits, analyze and address soft tissue restrictions, analyze and cue movement patterns, analyze and modify body mechanics/ergonomics, assess and modify postural abnormalities, address imbalance/dizziness and instruct in home and community integration to attain remaining goals. Progress toward goals / Updated goals:    Progressing toward goals:  1. Patient will demonstrate compliance with HEP. Added strengthening ex  2. Patient will complete Tinetti or DGI for further assessment of balance/functional mobility/gait. MET (Tinetti = 10/28)  3. Patient will maintain Romberg eyes closed 30\" to increase safety in challenging environments.  EC stance = 30\"       PLAN    [x]  Upgrade activities as tolerated YES Continue plan of care   []  Discharge due to :    []  Other:      Therapist: Bakari Daly PT    Date: 11/5/2019 Time: 1:08 PM     Future Appointments   Date Time Provider Jean Ragland   11/5/2019  1:30 PM Son Ayers PT [facility]   11/7/2019  1:30 PM Marisa Swift [address] [place] [address]   11/11/2019  2:30 PM Marisa Swift PT [facility]   11/13/2019  1:00 PM Marisa Swift [address] [place] [address]   11/18/2019  1:00 PM Son Ayers [address] [place] [address]   11/20/2019  1:30 PM Son Ayers PT [facility]   11/25/2019  1:00 PM Marisa Swift PT [facility]   11/27/2019  2:30 PM Jacinda Tomas, PT [facility]

## 2019-11-07 NOTE — PROGRESS NOTES
PHYSICAL THERAPY - DAILY TREATMENT NOTE    Patient Name: Norman Zuniga        Date: 2019  : 10/14/1926   YES Patient  Verified  Visit #:   4     Insurance: Payor: Rubin Bloch / Plan: [place] MEDICARE PART A & B / Product Type: Medicare /      In time: 1:27 Out time: 1:58   Total Treatment Time: 31     Medicare/BCBS [place] Time Tracking (below)   Total Timed Codes (min):  31 1:1 Treatment Time:  31     TREATMENT AREA =  Gait instability [R26.81]    SUBJECTIVE    Pain Level (on 0 to 10 scale):  8  / 10   Medication Changes/New allergies or changes in medical history, any new surgeries or procedures?    no    If yes, update Summary List   Subjective Functional Status/Changes:  []  No changes reported     \"I have been really tired for the last 2 days. I am still tired today. \"   \"I'm tired of being old. \"       OBJECTIVE    31 min Therapeutic Exercise:  [x]  See flow sheet   Rationale:      increase ROM, increase strength, improve coordination, improve balance and increase proprioception to improve the patients ability to perform       min Patient Education:  YES  Reviewed HEP   []  Progressed/Changed HEP based on:   Cont HEP     Other Objective/Functional Measures: Added S/L clam to promote hip abductor strength. Added lower trunk rotation to decrease low back pain. Held standing exercises due to patient's reports of LBP in stance and recent fatigue. Post Treatment Pain Level (on 0 to 10) scale:   8  / 10     ASSESSMENT    Assessment/Changes in Function:     LBP limited exercises today.      []  See Progress Note/Recertification   Patient will continue to benefit from skilled PT services to modify and progress therapeutic interventions, address functional mobility deficits, address ROM deficits, address strength deficits, analyze and address soft tissue restrictions, analyze and cue movement patterns, analyze and modify body mechanics/ergonomics, assess and modify postural abnormalities, address imbalance/dizziness and instruct in home and community integration to attain remaining goals. Progress toward goals / Updated goals:    Progressing toward goals:  1. Patient will demonstrate compliance with HEP.   2. Patient will complete Tinetti or DGI for further assessment of balance/functional mobility/gait.  MET on 11/1/2019 (Tinetti = 10/28)  3.  Patient will maintain Romberg eyes closed 30\" to increase safety in challenging environments.  EC stance = 30\"        PLAN    [x]  Upgrade activities as tolerated YES Continue plan of care   []  Discharge due to :    []  Other:      Therapist: Bartolo Campbell PT    Date: 11/7/2019 Time: 9:43 AM     Future Appointments   Date Time Provider Jean Ragland   11/7/2019  1:30 PM Reyna Gr [facility]   11/11/2019  2:30 PM Reyna Gr PT [facility]   11/13/2019  1:00 PM Reyna Gr[address]   11/18/2019  1:00 PM Patricia Meyer [address] [place] [address]   11/20/2019  1:30 PM Patricia Meyer [address] [place] [address]   11/25/2019  1:00 PM Reyna Gr [address] [place] [address]   11/27/2019  2:30 PM Patricia Meyer PT [facility]

## 2019-11-11 NOTE — PROGRESS NOTES
PHYSICAL THERAPY - DAILY TREATMENT NOTE    Patient Name: Dayna Espinoza        Date: 2019  : 10/14/1926   YES Patient  Verified  Visit #:     Insurance: Payor: Marilyn Oconnell / Plan: [place] MEDICARE PART A & B / Product Type: Medicare /      In time: 2:23 Out time: 2:57   Total Treatment Time: 34     Medicare/BCBS [place] Time Tracking (below)   Total Timed Codes (min):  34 1:1 Treatment Time:  34     TREATMENT AREA =  Gait instability [R26.81]    SUBJECTIVE    Pain Level (on 0 to 10 scale):  7-8  / 10   Medication Changes/New allergies or changes in medical history, any new surgeries or procedures?    no    If yes, update Summary List   Subjective Functional Status/Changes:  []  No changes reported     \"I ache all over today. Today is not a good day. \"  \"I can't walk in the mornings. My back hurts so bad. \"    \"Ever since new people moved in behind me, I have been scared. But I don't know what I am scared of.\"  Patient reports she has talked to her daughter about this but not her doctor. Reports noncompliance with HEP. States \"I don't have anyone to help me. \"       OBJECTIVE    34 min Therapeutic Exercise:  [x]  See flow sheet   Rationale:      increase ROM, increase strength, improve coordination, improve balance and increase proprioception to improve the patients ability to perform ADL's, gait, and functional mobility with increased safety/independence and decreased pain. min Patient Education:  YES  Reviewed HEP   []  Progressed/Changed HEP based on:   Cont HEP     Other Objective/Functional Measures:    SPO2 = 98% HR = 90 bpm BP = 130/58 after walking from lobby to treatment area of clinic. Therapist increased reps with most exercises today. Attempted Swiss ball #1 but patient unable to perform due to pain in left arm.     EO ROM: 30\"  EC stance: 18\" (patient requested to sit down)   Post Treatment Pain Level (on 0 to 10) scale:   7   10     ASSESSMENT    Assessment/Changes in Function:     Patient continues to require verbal cues to walk all the way to the mat with the walker (pateint wants to \"park\" walker away from mat). Patient required a seated rest break after walking from lobby to treatment area, but SPO2 remained > 90%. []  See Progress Note/Recertification   Patient will continue to benefit from skilled PT services to modify and progress therapeutic interventions, address functional mobility deficits, address ROM deficits, address strength deficits, analyze and address soft tissue restrictions, analyze and cue movement patterns, analyze and modify body mechanics/ergonomics, assess and modify postural abnormalities, address imbalance/dizziness and instruct in home and community integration to attain remaining goals. Progress toward goals / Updated goals:    Progressing toward goals:  1. Patient will demonstrate compliance with HEP.   reports non compliance with HEP  2. Patient will complete Tinetti or DGI for further assessment of balance/functional mobility/gait.  MET on 11/1/2019 (Tinetti = 10/28)  3.  Patient will maintain Romberg eyes closed 30\" to increase safety in challenging environments.      PLAN    [x]  Upgrade activities as tolerated YES Continue plan of care   []  Discharge due to :    []  Other:      Therapist: Sanjuana Dixon PT    Date: 11/11/2019 Time: 2:26 PM     Future Appointments   Date Time Provider Jean Ragland   11/11/2019  2:30 PM Mika Marlow PT [facility]   11/13/2019  1:00 PM Mika Marlow [address] [place] [address]   11/18/2019  1:00 PM Shahid Mcgrath [address] [place] [address]   11/20/2019  1:30 PM Shahid Mcgrath [address] [place] [address]   11/25/2019  1:00 PM Mika Marlow [address] [place] [address]   11/27/2019  2:30 PM Shahid Mcgrath PT [facility]

## 2019-11-13 NOTE — PROGRESS NOTES
PHYSICAL THERAPY - DAILY TREATMENT NOTE    Patient Name: Collins Morrow        Date: 2019  : 10/14/1926   YES Patient  Verified  Visit #:     Insurance: Payor: Rip Leary / Plan: VA MEDICARE PART A & B / Product Type: Medicare /      In time: 12:58 Out time: 1:29   Total Treatment Time: 31     Medicare/BCBS [place] Time Tracking (below)   Total Timed Codes (min):  31 1:1 Treatment Time:  31     TREATMENT AREA = Gait instability [R26.81]    SUBJECTIVE    Pain Level (on 0 to 10 scale):  0  / 10   Medication Changes/New allergies or changes in medical history, any new surgeries or procedures?    no    If yes, update Summary List   Subjective Functional Status/Changes:  []  No changes reported     \"I feel pretty good today. \"   \"I am walking a lot better. \"         OBJECTIVE    31 min Therapeutic Exercise:  [x]  See flow sheet   Rationale:      increase ROM and increase strength to improve the patients ability to perform ADL's, gait, and functional mobility with increased safety and independence. min Patient Education:  YES  Reviewed HEP   []  Progressed/Changed HEP based on:   Progressed EO/EC balance exercises per handout. Explained exercises to caregiver and advised patient to perform these exercises only when caregiver can stand next to her. Other Objective/Functional Measures:    HR = 109 bpm, SPO2 = 99% after standing   EO MSR(instep): 30\" B  EC narrow stance: 30\"    Sit to stand: 2 sets of 10 reps with B UE assist  SPO2 = 97% HR = 105 bpm    Max heart rate for patient = 127 bpm (target heart rate = 88 - 101 bpm)   Post Treatment Pain Level (on 0 to 10) scale:   0  / 10     ASSESSMENT    Assessment/Changes in Function:     Patient demonstrates shortness of breath after standing heel raises but SPO2 remains > 90%.      []  See Progress Note/Recertification   Patient will continue to benefit from skilled PT services to modify and progress therapeutic interventions, address functional mobility deficits, address ROM deficits, address strength deficits, analyze and address soft tissue restrictions, analyze and cue movement patterns, analyze and modify body mechanics/ergonomics, assess and modify postural abnormalities, address imbalance/dizziness and instruct in home and community integration to attain remaining goals. Progress toward goals / Updated goals:    Progressing toward goals:  1. Patient will demonstrate compliance with HEP.   reports compliance with balance exercises  2. Patient will complete Tinetti or DGI for further assessment of balance/functional mobility/gait. MET on 11/1/2019 (Tinetti = 10/28)  3.  Patient will maintain Romberg eyes closed 30\" to increase safety in challenging environments.  EC narrow stance = 30\"     PLAN    [x]  Upgrade activities as tolerated YES Continue plan of care   []  Discharge due to :    []  Other:      Therapist: Tolu Baxter PT    Date: 11/13/2019 Time: 1:02 PM     Future Appointments   Date Time Provider Jean Ragland   11/18/2019  1:00 PM Aneesh Resendez [address] [place] [address]   11/20/2019  1:30 PM Aneesh Resendez [address] [place] [address]   11/25/2019  1:00 PM Hannah Flores [address] [place] [address]   11/27/2019  2:30 PM Aneesh Resendez PT [facility]

## 2019-11-18 NOTE — PROGRESS NOTES
PHYSICAL THERAPY - DAILY TREATMENT NOTE    Patient Name: Karla Soto        Date: 2019  : 10/14/1926   YES Patient  Verified  Visit #:     Insurance: Payor: VA MEDICARE / Plan: VA MEDICARE PART A & B / Product Type: Medicare /      In time: 1:05 Out time:  1:32   Total Treatment Time: 27     Medicare/BCBS [place] Time Tracking (below)   Total Timed Codes (min):  27 1:1 Treatment Time:  27     TREATMENT AREA =  Gait instability [R26.81]  SUBJECTIVE    Pain Level (on 0 to 10 scale):  5-6  / 10   Medication Changes/New allergies or changes in medical history, any new surgeries or procedures? NO    If yes, update Summary List   Subjective Functional Status/Changes:  []  No changes reported     Pt reports \"all over\" aching pain. Pt and pt's caregiver Dennis Ibanez) report that she fell on Thursday. Pt reports that she was in kitchen and everything went black. Pt reports that she fell, knocking over chairs in the process. Pt reports that she crawled out from under chairs and to front room of house to call Asad, who called the police. Pt reports that police came and helped her up. Pt reports that she did not have to go to MD or ER, and that she did not sustain any injuries. Pt states that [place] comes in during the week, but not on the weekends. Pt reports that sometimes family checks on her on the weekend.        OBJECTIVE    27 min Therapeutic Exercise:  [x]  See flow sheet   Rationale:      increase ROM, increase strength, improve coordination, improve balance and increase proprioception to improve the patients ability to perform ADLs/IADLs, functional mobility and gait safely and independently without increased pain/symptoms     During TE min Patient Education:  YES  Reviewed HEP   []  Progressed/Changed HEP based on:   Discussed keeping call button and phone with pt at all times to increase ease of calling for help in case of another fall in future     Other Objective/Functional Measures:    Presented ambulating with RW with Trendelenburg gait pattern and flexed posture, and tendency to push RW ahead of body - provided verbal cues for upright posture and correct position of RW in relation to body  Performed exercises per flowsheet  Added resistance clam     Post Treatment Pain Level (on 0 to 10) scale:   5-6  / 10     ASSESSMENT    Assessment/Changes in Function:     Tolerated exercises without complaints     []  See Progress Note/Recertification   Patient will continue to benefit from skilled PT services to modify and progress therapeutic interventions, address functional mobility deficits, address ROM deficits, address strength deficits, analyze and address soft tissue restrictions, analyze and cue movement patterns, analyze and modify body mechanics/ergonomics, assess and modify postural abnormalities, address imbalance/dizziness and instruct in home and community integration to attain remaining goals. Progress toward goals / Updated goals:    Progressing toward goals:  1. Patient will demonstrate compliance with HEP.   2. Patient will complete Tinetti or DGI for further assessment of balance/functional mobility/gait. MET on 11/1/2019 (Tinetti = 10/28)  3.  Patient will maintain Romberg eyes closed 30\" to increase safety in challenging environments.         PLAN    [x]  Upgrade activities as tolerated YES Continue plan of care   []  Discharge due to :    []  Other:      Therapist: Alexy Pfeiffer PT    Date: 11/18/2019 Time: 1:11 PM     Future Appointments   Date Time Provider Jean Ragland   11/20/2019  1:30 PM Vandana Waggoner, [address] [place] [address]   11/25/2019  1:00 PM Manjeet Pearson [address] [place] [address]   11/27/2019  2:30 PM Vandana Waggoner PT [facility]

## 2019-11-20 NOTE — PROGRESS NOTES
[facility] PHYSICAL THERAPY  [address] [place] [address] Chantel Arana, [address] - Phone: [phone]  Fax: [phone] [address] [place] [address] Fam Hutson          Patient Name: Tony Walter : 10/14/1926   Treatment/Medical Diagnosis: Gait instability [R26.81]   Onset Date: Chronic with recent worsening    Referral Source: Marcos Galeana MD Start of Care [facility]): 10/23/2019   Prior Hospitalization: See Medical History Provider #: [id number]   Prior Level of Function: Ambulating with RW since 2017; multiple falls; living alone with [facility] assistance 6 hours/day, 5 days/week   Comorbidities: Heart disease, MI, depression, anxiety, osteoporosis, arthritis, thyroid disorder, HTN, visual impairment, hearing impairment, asthma, breast cancer, right mastectomy, lymphedema right UE, B TKR, B cataract surgery, UTI, left hip pain   Medications: Verified on Patient Summary List   [facility] from [facility]: 8 Missed Visits: 0     Goal/Measure of Progress Goal Met? 1. Patient will demonstrate compliance with HEP. Status at last Eval: NA Current Status: Compliant with HEP yes   2. Patient will complete Tinetti or DGI for further assessment of balance/functional mobility/gait. Status at last Eval: NA Current Status: Tinetti = 10/28 on 2019;  on 2019 yes   3. Patient will maintain Romberg eyes closed 30\" to increase safety in challenging environments. Status at last Eval: Rom EC = 5\" Current Status: Rom EC = 30\" yes     Key Functional Changes/Progress: Patient has progressed well with exercises in clinic, demonstrates compliance with HEP. Static standing balance has improved with patient able to maintain modified sharpened Romberg with heel at bunion eyes open 30\"B and Romberg eyes closed 30\". Dynamic standing balance/functional mobility/gait have improved with Tinetti increased from 10/28 (high risk of falling) to  (moderate risk of falling).   Patient ambulates with RW with flexed posture and left Trendelenburg gait pattern. Problem List: pain affecting function, decrease ROM, decrease strength, impaired gait/ balance, decrease ADL/ functional abilitiies, decrease activity tolerance, decrease flexibility/ joint mobility and decrease transfer abilities   Treatment Plan may include any combination of the following: Therapeutic exercise, Therapeutic activities, Neuromuscular re-education, Physical agent/modality, Gait/balance training, Manual therapy, Patient education, Functional mobility training, Home safety training and Stair training  Patient Goal(s) has been updated and includes:  \"Better balance. \"    Goals for this certification period include and are to be achieved in   2-4  weeks:  1. Patient will demonstrate independence with HEP. 2. Patient will demonstrate 4 point improvement on Tinetti or DGI to indicate improved balance/functional mobility/gait. 3. Patient will score greater than or equal to 50/100 on FOTO to indicate improved function. Frequency / Duration:   Patient to be seen   2   times per week for   2-4    weeks:    Assessments/Recommendations: Patient would benefit from continued skilled PT services to address decreased ROM, decreased strength, decreased balance, decreased activity tolerance and impaired functional mobility/gait to improve the patient's ability to perform ADLs/IADLs, functional mobility and gait safely and independently without increased pain/symptoms. If you have any questions/comments please contact us directly at [phone]. Thank you for allowing us to assist in the care of your patient. Therapist Signature: Callie Pablo PT Date: 75/44/9321   Certification Period:  Reporting Period: 10/23/2019-1/22/2020  10/23/2019-11/20/2019 Time: 1:44 PM   NOTE TO PHYSICIAN:  PLEASE COMPLETE THE ORDERS BELOW AND FAX TO   [facility]: [phone].   If you are unable to process this request in 24 hours please contact our office: [phone].    ___ I have read the above report and request that my patient continue as recommended.   ___ I have read the above report and request that my patient continue therapy with the following changes/special instructions: ________________________________________________   ___ I have read the above report and request that my patient be discharged from therapy.      Physician Signature:        Date:       Time:

## 2019-11-20 NOTE — PROGRESS NOTES
PHYSICAL THERAPY - DAILY TREATMENT NOTE    Patient Name: Gisella Adkins        Date: 2019  : 10/14/1926   YES Patient  Verified  Visit #:     Insurance: Payor: Iris Cheng / Plan: VA MEDICARE PART A & B / Product Type: Medicare /      In time: 1:30 Out time: 2:03   Total Treatment Time: 33     Medicare/BCBS [place] Time Tracking (below)   Total Timed Codes (min):  33 1:1 Treatment Time:  33     TREATMENT AREA =  Gait instability [R26.81]  SUBJECTIVE    Pain Level (on 0 to 10 scale):  0  / 10   Medication Changes/New allergies or changes in medical history, any new surgeries or procedures? NO    If yes, update Summary List   Subjective Functional Status/Changes:  []  No changes reported     Pt reports that she got up late this morning.           OBJECTIVE    15 min Therapeutic Exercise:  [x]  See flow sheet   Rationale:      increase ROM, increase strength, improve coordination, improve balance and increase proprioception to improve the patients ability to perform ADLs/IADLs, functional mobility and gait safely and independently without increased pain/symptoms     12 min Therapeutic Activity: Tinetti, 6-meter walk   Rationale: assess functional mobility and gait to improve the patient's ability to perform ADLs/IADLs, functional mobility and gait safely and independently without increased pain/symptoms     6 min Neuromuscular Re-ed: EO/EC balance   Rationale: assess/improve balance to improve the patient's ability to perform ADLs/IADLs, functional mobility and gait safely and independently without increased pain/symptoms     During TE/TA/NM min Patient Education:  YES  Reviewed HEP   []  Progressed/Changed HEP based on:   Cont HEP     Other Objective/Functional Measures:    Exercises per flowsheet    Ambulates with RW with flexed posture, left Trendelenburg gait pattern    Tinetti = 20/28    6-meter walk test = 22\" (0.28 m/s)    MSR EO = bun 30\"B    Rom EC = 30\"     Post Treatment Pain Level (on 0 to 10) scale:   0  / 10     ASSESSMENT    Assessment/Changes in Function:     See continued plan of care     [x]  See Progress Note/Recertification   Patient will continue to benefit from skilled PT services: see continued plan of care   Progress toward goals / Updated goals:    See continued plan of care     PLAN    [x]  Upgrade activities as tolerated YES Continue plan of care   []  Discharge due to :    []  Other:      Therapist: Callie Pablo PT    Date: 11/20/2019 Time: 1:33 PM     Future Appointments   Date Time Provider Jean Ragland   11/25/2019  1:00 PM Rose Pavon [address] [place] [address]   11/27/2019  2:30 PM Martine[address]   12/2/2019  1:00 PM Rose Pavon [address] [place] [address]   12/4/2019  1:00 PM Rose Pavon PT [facility]   12/9/2019  1:00 PM [address]   12/11/2019  1:00 PM Rose Pavon [address] [place] [address]   12/16/2019  1:00 PM Martine [address]   12/18/2019  1:00 PM Rose Pavon PT [facility]

## 2019-11-25 NOTE — PROGRESS NOTES
PHYSICAL THERAPY - DAILY TREATMENT NOTE    Patient Name: Sabrina Hunter        Date: 2019  : 10/14/1926   YES Patient  Verified  Visit #:     Insurance: Payor: Bennie Szymanski / Plan: VA MEDICARE PART A & B / Product Type: Medicare /      In time: 12:57 Out time: 1:29   Total Treatment Time: 32     Medicare/BCBS [place] Time Tracking (below)   Total Timed Codes (min):  32 1:1 Treatment Time:  32     TREATMENT AREA =  Gait instability [R26.81]    SUBJECTIVE    Pain Level (on 0 to 10 scale):  0  / 10   Medication Changes/New allergies or changes in medical history, any new surgeries or procedures?    no    If yes, update Summary List   Subjective Functional Status/Changes:  []  No changes reported     Reports compliance with HEP. States she is able to walk around her home \"a little easier. \"          OBJECTIVE    32 min Therapeutic Exercise:  [x]  See flow sheet   Rationale:      increase ROM, increase strength, improve coordination, improve balance and increase proprioception to improve the patients ability to perform ADL's, gait, and functional mobility with increased safety and independence. min Patient Education:  YES  Reviewed HEP   []  Progressed/Changed HEP based on:   Cont HEP     Other Objective/Functional Measures:    Increased reps with H/L hip abd/add, side lying clam, LAQ, seated knee flexion, and standing heel/toe raise. Post Treatment Pain Level (on 0 to 10) scale:   1  / 10 low back     ASSESSMENT    Assessment/Changes in Function:     Patient required 2 therapeutic rest breaks during therapy - 1 after 5 rep of sit to stand transfers and 1 after 10 reps of standing heel/toe raises.      []  See Progress Note/Recertification   Patient will continue to benefit from skilled PT services to modify and progress therapeutic interventions, address functional mobility deficits, address ROM deficits, address strength deficits, analyze and address soft tissue restrictions, analyze and cue movement patterns, analyze and modify body mechanics/ergonomics, assess and modify postural abnormalities, address imbalance/dizziness and instruct in home and community integration to attain remaining goals. Progress toward goals / Updated goals:    · Goals for this certification period include and are to be achieved in   2-4  weeks:  1. Patient will demonstrate independence with HEP. Reports compliance with HEP  2. Patient will demonstrate 4 point improvement on Tinetti or DGI to indicate improved balance/functional mobility/gait. Progressed LE strengthening   3. Patient will score greater than or equal to 50/100 on FOTO to indicate improved function.      PLAN    [x]  Upgrade activities as tolerated YES Continue plan of care   []  Discharge due to :    []  Other:      Therapist: Genoveva Vaughn PT    Date: 11/25/2019 Time: 1:02 PM     Future Appointments   Date Time Provider Jean Ragland   11/27/2019  2:30 PM Don Mae PT [facility]   12/2/2019  1:00 PM Sudarshan Stewart [address] [place] [address]   12/4/2019  1:00 PM Sudarshan Stewart PT [facility]   12/9/2019  1:00 PM Don Mae PT [facility]   12/11/2019  1:00 PM Sudarshan Stewart [address] [place] [address]   12/16/2019  1:00 PM Don Mae PT [facility]   12/18/2019  1:00 PM Sudarshan Stewart PT [facility]

## 2019-11-27 NOTE — PROGRESS NOTES
PHYSICAL THERAPY - DAILY TREATMENT NOTE    Patient Name: Eliane Monreo        Date: 2019  : 10/14/1926   YES Patient  Verified  Visit #:   10   of   8-16  Insurance: Payor: Letty Radford / Plan: [place] & B / Product Type: Medicare /      In time: 2:25 Out time: 2:56   Total Treatment Time: 31     Medicare/BCBS [place] Time Tracking (below)   Total Timed Codes (min):  31 1:1 Treatment Time:  31     TREATMENT AREA =  Gait instability [R26.81]  SUBJECTIVE    Pain Level (on 0 to 10 scale):  0  / 10   Medication Changes/New allergies or changes in medical history, any new surgeries or procedures? NO    If yes, update Summary List   Subjective Functional Status/Changes:  []  No changes reported     Pt denies pain this session.           OBJECTIVE    31 min Therapeutic Exercise:  [x]  See flow sheet   Rationale:      increase ROM, increase strength, improve coordination, improve balance and increase proprioception to improve the patients ability to perform ADLs/IADLs, functional mobility and gait safely and independently without increased pain/symptoms     During TE min Patient Education:  YES  Reviewed HEP   []  Progressed/Changed HEP based on:   Cont HEP     Other Objective/Functional Measures:    Increased reps HR/TR  Provided seated therapeutic rest breaks as needed     Post Treatment Pain Level (on 0 to 10) scale:   0  / 10     ASSESSMENT    Assessment/Changes in Function:     Tolerated exercises with c/o fatigue only     []  See Progress Note/Recertification   Patient will continue to benefit from skilled PT services to modify and progress therapeutic interventions, address functional mobility deficits, address ROM deficits, address strength deficits, analyze and address soft tissue restrictions, analyze and cue movement patterns, analyze and modify body mechanics/ergonomics, assess and modify postural abnormalities, address imbalance/dizziness and instruct in home and community integration to attain remaining goals. Progress toward goals / Updated goals:    Progressing toward goals:  · Goals for this certification period include and are to be achieved in   2-4  weeks:  1. Patient will demonstrate independence with HEP. Reports compliance with HEP  2. Patient will demonstrate 4 point improvement on Tinetti or DGI to indicate improved balance/functional mobility/gait. Progressed LE strengthening   3. Patient will score greater than or equal to 50/100 on FOTO to indicate improved function.        PLAN    [x]  Upgrade activities as tolerated YES Continue plan of care   []  Discharge due to :    []  Other:      Therapist: Purnima Murrieta PT    Date: 11/27/2019 Time: 2:27 PM     Future Appointments   Date Time Provider Jean Ragland   11/27/2019  2:30 PM Don Mae PT [facility]   12/2/2019  1:00 PM Sudarshan Stewart [address] [place] [address]   12/4/2019  1:00 PM Sudarshan Stewart PT [facility]   12/9/2019  1:00 PM Don Mae PT [facility]   12/11/2019  1:00 PM Sudarshan Stewart [address] [place] [address]   12/16/2019  1:00 PM Don Mae PT [facility]   12/18/2019  1:00 PM Sudarshan Stewart PT [facility]

## 2019-12-09 NOTE — PROGRESS NOTES
Dear :  Luiz Fraga MD, under your direction, we have been providing physical therapy for your patient Umm Maria, for a diagnosis of Gait instability [R26.81]. The patient was scheduled for 8-16 visits. They attended 10 of them and was last seen on 11/27/2019. Spoke with patient's daughter on 12/9/2019. Daughter requested her mother be discharged from physical therapy at this time. Daughter stated, Abe Dhillon doesn't want to do anything. \"    Per above, we are discharging the patient from physical therapy at this time. We appreciate the kind referral and would willingly work with this patient again, if she needs further outpatient physical therapy. Your patient's health is our primary concern. Should you have any further questions or concerns, please feel free to contact me at your convenience. Sincerely,     Angi Amin, PT                   12/9/2019      NOTE TO PHYSICIAN:  PLEASE COMPLETE THE ORDERS BELOW AND FAX TO   [facility] Physical [facility]: [phone]. If you are unable to process this request in 24 hours please contact our office: [phone].    ___ I have read the above report and request that my patient be discharged from therapy.      Physician Signature:        Date:       Time:

## 2020-01-01 ENCOUNTER — HOME CARE VISIT (OUTPATIENT)
Dept: SCHEDULING | Facility: HOME HEALTH | Age: 85
End: 2020-01-01
Payer: MEDICARE

## 2020-01-01 ENCOUNTER — HOME CARE VISIT (OUTPATIENT)
Dept: HOME HEALTH SERVICES | Facility: HOME HEALTH | Age: 85
End: 2020-01-01

## 2020-01-01 ENCOUNTER — HOSPITAL ENCOUNTER (OUTPATIENT)
Dept: LAB | Age: 85
Discharge: HOME | End: 2020-05-05
Payer: MEDICARE

## 2020-01-01 ENCOUNTER — APPOINTMENT (OUTPATIENT)
Dept: CT IMAGING | Age: 85
End: 2020-01-01
Attending: EMERGENCY MEDICINE
Payer: MEDICARE

## 2020-01-01 ENCOUNTER — HOME CARE VISIT (OUTPATIENT)
Dept: HOSPICE | Facility: HOSPICE | Age: 85
End: 2020-01-01
Payer: MEDICARE

## 2020-01-01 ENCOUNTER — HOME CARE VISIT (OUTPATIENT)
Dept: HOME HEALTH SERVICES | Facility: HOME HEALTH | Age: 85
End: 2020-01-01
Payer: MEDICARE

## 2020-01-01 ENCOUNTER — HOSPITAL ENCOUNTER (OUTPATIENT)
Dept: LAB | Age: 85
Discharge: HOME | End: 2020-03-23
Payer: MEDICARE

## 2020-01-01 ENCOUNTER — HOME HEALTH ADMISSION (OUTPATIENT)
Dept: HOME HEALTH SERVICES | Facility: HOME HEALTH | Age: 85
End: 2020-01-01
Payer: MEDICARE

## 2020-01-01 ENCOUNTER — HOSPICE ADMISSION (OUTPATIENT)
Dept: HOSPICE | Facility: HOSPICE | Age: 85
End: 2020-01-01
Payer: MEDICARE

## 2020-01-01 ENCOUNTER — HOSPITAL ENCOUNTER (EMERGENCY)
Age: 85
Discharge: HOME | End: 2020-05-10
Attending: EMERGENCY MEDICINE
Payer: MEDICARE

## 2020-01-01 ENCOUNTER — APPOINTMENT (OUTPATIENT)
Dept: GENERAL RADIOLOGY | Age: 85
End: 2020-01-01
Attending: EMERGENCY MEDICINE
Payer: MEDICARE

## 2020-01-01 VITALS
SYSTOLIC BLOOD PRESSURE: 128 MMHG | TEMPERATURE: 98.3 F | DIASTOLIC BLOOD PRESSURE: 60 MMHG | HEART RATE: 67 BPM | OXYGEN SATURATION: 96 % | RESPIRATION RATE: 12 BRPM

## 2020-01-01 VITALS
RESPIRATION RATE: 18 BRPM | SYSTOLIC BLOOD PRESSURE: 158 MMHG | HEART RATE: 56 BPM | TEMPERATURE: 98.8 F | OXYGEN SATURATION: 96 %

## 2020-01-01 VITALS
TEMPERATURE: 98.1 F | DIASTOLIC BLOOD PRESSURE: 62 MMHG | SYSTOLIC BLOOD PRESSURE: 138 MMHG | RESPIRATION RATE: 18 BRPM | OXYGEN SATURATION: 96 % | HEART RATE: 70 BPM

## 2020-01-01 VITALS
SYSTOLIC BLOOD PRESSURE: 98 MMHG | WEIGHT: 148 LBS | BODY MASS INDEX: 27.23 KG/M2 | HEIGHT: 62 IN | HEART RATE: 60 BPM | DIASTOLIC BLOOD PRESSURE: 52 MMHG | RESPIRATION RATE: 18 BRPM | TEMPERATURE: 95.6 F

## 2020-01-01 VITALS — HEART RATE: 44 BPM | RESPIRATION RATE: 20 BRPM

## 2020-01-01 VITALS
TEMPERATURE: 97.7 F | RESPIRATION RATE: 16 BRPM | OXYGEN SATURATION: 97 % | SYSTOLIC BLOOD PRESSURE: 124 MMHG | DIASTOLIC BLOOD PRESSURE: 60 MMHG | HEART RATE: 72 BPM

## 2020-01-01 VITALS
SYSTOLIC BLOOD PRESSURE: 142 MMHG | TEMPERATURE: 97.6 F | OXYGEN SATURATION: 94 % | HEART RATE: 78 BPM | RESPIRATION RATE: 16 BRPM | DIASTOLIC BLOOD PRESSURE: 58 MMHG

## 2020-01-01 VITALS
TEMPERATURE: 98.9 F | OXYGEN SATURATION: 96 % | DIASTOLIC BLOOD PRESSURE: 56 MMHG | SYSTOLIC BLOOD PRESSURE: 108 MMHG | RESPIRATION RATE: 18 BRPM | HEART RATE: 60 BPM

## 2020-01-01 VITALS
HEART RATE: 72 BPM | DIASTOLIC BLOOD PRESSURE: 41 MMHG | WEIGHT: 148.5 LBS | SYSTOLIC BLOOD PRESSURE: 111 MMHG | BODY MASS INDEX: 28.06 KG/M2 | RESPIRATION RATE: 22 BRPM

## 2020-01-01 VITALS
TEMPERATURE: 98.7 F | DIASTOLIC BLOOD PRESSURE: 58 MMHG | HEART RATE: 63 BPM | OXYGEN SATURATION: 96 % | WEIGHT: 150 LBS | BODY MASS INDEX: 28.34 KG/M2 | RESPIRATION RATE: 18 BRPM | SYSTOLIC BLOOD PRESSURE: 130 MMHG

## 2020-01-01 VITALS
TEMPERATURE: 98.1 F | DIASTOLIC BLOOD PRESSURE: 68 MMHG | RESPIRATION RATE: 18 BRPM | HEART RATE: 76 BPM | SYSTOLIC BLOOD PRESSURE: 130 MMHG | OXYGEN SATURATION: 98 %

## 2020-01-01 VITALS
HEART RATE: 70 BPM | OXYGEN SATURATION: 98 % | DIASTOLIC BLOOD PRESSURE: 60 MMHG | RESPIRATION RATE: 18 BRPM | SYSTOLIC BLOOD PRESSURE: 128 MMHG | TEMPERATURE: 98 F

## 2020-01-01 VITALS — TEMPERATURE: 98 F | RESPIRATION RATE: 16 BRPM | HEART RATE: 45 BPM | OXYGEN SATURATION: 91 %

## 2020-01-01 VITALS
TEMPERATURE: 98.4 F | DIASTOLIC BLOOD PRESSURE: 68 MMHG | HEART RATE: 76 BPM | RESPIRATION RATE: 18 BRPM | OXYGEN SATURATION: 98 % | SYSTOLIC BLOOD PRESSURE: 102 MMHG

## 2020-01-01 VITALS
HEART RATE: 64 BPM | RESPIRATION RATE: 18 BRPM | TEMPERATURE: 97.6 F | DIASTOLIC BLOOD PRESSURE: 62 MMHG | OXYGEN SATURATION: 98 % | SYSTOLIC BLOOD PRESSURE: 142 MMHG

## 2020-01-01 VITALS
TEMPERATURE: 97.3 F | RESPIRATION RATE: 16 BRPM | DIASTOLIC BLOOD PRESSURE: 50 MMHG | OXYGEN SATURATION: 97 % | SYSTOLIC BLOOD PRESSURE: 124 MMHG | HEART RATE: 77 BPM

## 2020-01-01 VITALS
SYSTOLIC BLOOD PRESSURE: 138 MMHG | RESPIRATION RATE: 18 BRPM | DIASTOLIC BLOOD PRESSURE: 62 MMHG | HEART RATE: 70 BPM | OXYGEN SATURATION: 98 % | TEMPERATURE: 98 F

## 2020-01-01 VITALS — TEMPERATURE: 98 F | RESPIRATION RATE: 20 BRPM | OXYGEN SATURATION: 90 %

## 2020-01-01 DIAGNOSIS — I95.9 HYPOTENSION, UNSPECIFIED HYPOTENSION TYPE: ICD-10-CM
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
## 2020-01-01 DIAGNOSIS — I49.5 SICK SINUS SYNDROME (HCC): ICD-10-CM

## 2020-01-01 DIAGNOSIS — R00.1 BRADYCARDIA: ICD-10-CM

## 2020-01-01 LAB
ALBUMIN SERPL-MCNC: 3.1 G/DL (ref 3.4–5)
ALBUMIN/GLOB SERPL: 0.8 {RATIO} (ref 0.8–1.7)
ALP SERPL-CCNC: 155 U/L (ref 45–117)
ALT SERPL-CCNC: 13 U/L (ref 13–56)
ANION GAP SERPL CALC-SCNC: 6 MMOL/L (ref 3–18)
APPEARANCE UR: ABNORMAL
APPEARANCE UR: CLEAR
AST SERPL-CCNC: 16 U/L (ref 10–38)
ATRIAL RATE: 0 BPM
ATRIAL RATE: 49 BPM
BACTERIA SPEC CULT: ABNORMAL
BACTERIA SPEC CULT: NORMAL
BACTERIA URNS QL MICRO: ABNORMAL /HPF
BACTERIA URNS QL MICRO: ABNORMAL /HPF
BASOPHILS # BLD: 0.1 K/UL (ref 0–0.1)
BASOPHILS NFR BLD: 1 % (ref 0–2)
BILIRUB SERPL-MCNC: 0.4 MG/DL (ref 0.2–1)
BILIRUB UR QL: NEGATIVE
BILIRUB UR QL: NEGATIVE
BUN SERPL-MCNC: 23 MG/DL (ref 7–18)
BUN/CREAT SERPL: 24 (ref 12–20)
CALCIUM SERPL-MCNC: 8.7 MG/DL (ref 8.5–10.1)
CALCULATED R AXIS, ECG10: 14 DEGREES
CALCULATED R AXIS, ECG10: 21 DEGREES
CALCULATED T AXIS, ECG11: 27 DEGREES
CALCULATED T AXIS, ECG11: 27 DEGREES
CC UR VC: NORMAL
CHLORIDE SERPL-SCNC: 97 MMOL/L (ref 100–111)
CK MB CFR SERPL CALC: 4 % (ref 0–4)
CK MB SERPL-MCNC: 2 NG/ML (ref 5–25)
CK SERPL-CCNC: 50 U/L (ref 26–192)
CO2 SERPL-SCNC: 27 MMOL/L (ref 21–32)
COLOR UR: YELLOW
COLOR UR: YELLOW
CREAT SERPL-MCNC: 0.95 MG/DL (ref 0.6–1.3)
DIAGNOSIS, 93000: NORMAL
DIAGNOSIS, 93000: NORMAL
DIFFERENTIAL METHOD BLD: ABNORMAL
EOSINOPHIL # BLD: 0.6 K/UL (ref 0–0.4)
EOSINOPHIL NFR BLD: 6 % (ref 0–5)
EPITH CASTS URNS QL MICRO: ABNORMAL /LPF (ref 0–5)
EPITH CASTS URNS QL MICRO: ABNORMAL /LPF (ref 0–5)
ERYTHROCYTE [DISTWIDTH] IN BLOOD BY AUTOMATED COUNT: 14.3 % (ref 11.6–14.5)
GLOBULIN SER CALC-MCNC: 4.1 G/DL (ref 2–4)
GLUCOSE SERPL-MCNC: 128 MG/DL (ref 74–99)
GLUCOSE UR STRIP.AUTO-MCNC: NEGATIVE MG/DL
GLUCOSE UR STRIP.AUTO-MCNC: NEGATIVE MG/DL
HCT VFR BLD AUTO: 32.8 % (ref 35–45)
HGB BLD-MCNC: 10.8 G/DL (ref 12–16)
HGB UR QL STRIP: ABNORMAL
HGB UR QL STRIP: NEGATIVE
HYALINE CASTS URNS QL MICRO: ABNORMAL /LPF (ref 0–2)
INR PPP: 1.1 (ref 0.8–1.2)
KETONES UR QL STRIP.AUTO: NEGATIVE MG/DL
KETONES UR QL STRIP.AUTO: NEGATIVE MG/DL
LEUKOCYTE ESTERASE UR QL STRIP.AUTO: ABNORMAL
LEUKOCYTE ESTERASE UR QL STRIP.AUTO: ABNORMAL
LYMPHOCYTES # BLD: 1.2 K/UL (ref 0.9–3.6)
LYMPHOCYTES NFR BLD: 13 % (ref 21–52)
MCH RBC QN AUTO: 26.9 PG (ref 24–34)
MCHC RBC AUTO-ENTMCNC: 32.9 G/DL (ref 31–37)
MCV RBC AUTO: 81.6 FL (ref 74–97)
MONOCYTES # BLD: 0.6 K/UL (ref 0.05–1.2)
MONOCYTES NFR BLD: 7 % (ref 3–10)
NEUTS SEG # BLD: 6.5 K/UL (ref 1.8–8)
NEUTS SEG NFR BLD: 73 % (ref 40–73)
NITRITE UR QL STRIP.AUTO: NEGATIVE
NITRITE UR QL STRIP.AUTO: NEGATIVE
PH UR STRIP: 5 [PH] (ref 5–8)
PH UR STRIP: 5.5 [PH] (ref 5–8)
PLATELET # BLD AUTO: 281 K/UL (ref 135–420)
PMV BLD AUTO: 8 FL (ref 9.2–11.8)
POTASSIUM SERPL-SCNC: 4.6 MMOL/L (ref 3.5–5.5)
PROT SERPL-MCNC: 7.2 G/DL (ref 6.4–8.2)
PROT UR STRIP-MCNC: 30 MG/DL
PROT UR STRIP-MCNC: ABNORMAL MG/DL
PROTHROMBIN TIME: 13.7 SEC (ref 11.5–15.2)
Q-T INTERVAL, ECG07: 496 MS
Q-T INTERVAL, ECG07: 506 MS
QRS DURATION, ECG06: 168 MS
QRS DURATION, ECG06: 76 MS
QTC CALCULATION (BEZET), ECG08: 427 MS
QTC CALCULATION (BEZET), ECG08: 429 MS
RBC # BLD AUTO: 4.02 M/UL (ref 4.2–5.3)
RBC #/AREA URNS HPF: ABNORMAL /HPF (ref 0–5)
RBC #/AREA URNS HPF: ABNORMAL /HPF (ref 0–5)
SERVICE CMNT-IMP: ABNORMAL
SERVICE CMNT-IMP: NORMAL
SODIUM SERPL-SCNC: 130 MMOL/L (ref 136–145)
SP GR UR REFRACTOMETRY: 1.02 (ref 1–1.03)
SP GR UR REFRACTOMETRY: 1.02 (ref 1–1.03)
T3 SERPL-MCNC: 69 NG/DL (ref 71–180)
T4 FREE SERPL-MCNC: 1.3 NG/DL (ref 0.7–1.5)
TROPONIN I SERPL-MCNC: <0.02 NG/ML (ref 0–0.04)
TSH SERPL DL<=0.05 MIU/L-ACNC: 4.04 UIU/ML (ref 0.36–3.74)
UROBILINOGEN UR QL STRIP.AUTO: 0.2 EU/DL (ref 0.2–1)
UROBILINOGEN UR QL STRIP.AUTO: 0.2 EU/DL (ref 0.2–1)
VENTRICULAR RATE, ECG03: 43 BPM
VENTRICULAR RATE, ECG03: 45 BPM
WBC # BLD AUTO: 8.9 K/UL (ref 4.6–13.2)
WBC URNS QL MICRO: ABNORMAL /HPF (ref 0–4)
WBC URNS QL MICRO: ABNORMAL /HPF (ref 0–4)

## 2020-01-01 PROCEDURE — 3331090001 HH PPS REVENUE CREDIT

## 2020-01-01 PROCEDURE — A6260 WOUND CLEANSER ANY TYPE/SIZE: HCPCS

## 2020-01-01 PROCEDURE — G0299 HHS/HOSPICE OF RN EA 15 MIN: HCPCS

## 2020-01-01 PROCEDURE — 0651 HSPC ROUTINE HOME CARE

## 2020-01-01 PROCEDURE — 3331090002 HH PPS REVENUE DEBIT

## 2020-01-01 PROCEDURE — 93005 ELECTROCARDIOGRAM TRACING: CPT

## 2020-01-01 PROCEDURE — 81001 URINALYSIS AUTO W/SCOPE: CPT

## 2020-01-01 PROCEDURE — A6212 FOAM DRG <=16 SQ IN W/BORDER: HCPCS

## 2020-01-01 PROCEDURE — 82550 ASSAY OF CK (CPK): CPT

## 2020-01-01 PROCEDURE — 3336500001 HSPC ELECTION

## 2020-01-01 PROCEDURE — T4524 ADULT SIZE BRIEF/DIAPER XL: HCPCS

## 2020-01-01 PROCEDURE — 87186 SC STD MICRODIL/AGAR DIL: CPT

## 2020-01-01 PROCEDURE — 87077 CULTURE AEROBIC IDENTIFY: CPT

## 2020-01-01 PROCEDURE — 84443 ASSAY THYROID STIM HORMONE: CPT

## 2020-01-01 PROCEDURE — 84480 ASSAY TRIIODOTHYRONINE (T3): CPT

## 2020-01-01 PROCEDURE — G0155 HHCP-SVS OF CSW,EA 15 MIN: HCPCS

## 2020-01-01 PROCEDURE — 87086 URINE CULTURE/COLONY COUNT: CPT

## 2020-01-01 PROCEDURE — 400013 HH SOC

## 2020-01-01 PROCEDURE — G0156 HHCP-SVS OF AIDE,EA 15 MIN: HCPCS

## 2020-01-01 PROCEDURE — A6216 NON-STERILE GAUZE<=16 SQ IN: HCPCS

## 2020-01-01 PROCEDURE — 70450 CT HEAD/BRAIN W/O DYE: CPT

## 2020-01-01 PROCEDURE — 99285 EMERGENCY DEPT VISIT HI MDM: CPT

## 2020-01-01 PROCEDURE — A6223 GAUZE >16<=48 NO W/SAL W/O B: HCPCS

## 2020-01-01 PROCEDURE — 84439 ASSAY OF FREE THYROXINE: CPT

## 2020-01-01 PROCEDURE — 80053 COMPREHEN METABOLIC PANEL: CPT

## 2020-01-01 PROCEDURE — A9270 NON-COVERED ITEM OR SERVICE: HCPCS

## 2020-01-01 PROCEDURE — 3331090004 HSPC SERVICE INTENSITY ADD-ON

## 2020-01-01 PROCEDURE — 85610 PROTHROMBIN TIME: CPT

## 2020-01-01 PROCEDURE — T4541 LARGE DISPOSABLE UNDERPAD: HCPCS

## 2020-01-01 PROCEDURE — HOSPICE MEDICATION HC HH HOSPICE MEDICATION

## 2020-01-01 PROCEDURE — 85025 COMPLETE CBC W/AUTO DIFF WBC: CPT

## 2020-01-01 PROCEDURE — A6446 CONFORM BAND S W>=3" <5"/YD: HCPCS

## 2020-05-10 NOTE — ED NOTES
Pt discharge instructions reviewed with patient and family POA, patient and family POA denies questions and verbalizes understanding. IVs removed and all belongings with patient. Pt alert and oriented, no signs of distress. Pt assisted to wheelchair and transported to ED waiting room

## 2020-05-10 NOTE — ED NOTES
Family at bedside requesting discharge. Pt return to baseline speech reading a book and talking on phone. Chilo gould and ginger ale brought to bedside.

## 2020-05-10 NOTE — ED PROVIDER NOTES
EMERGENCY DEPARTMENT HISTORY AND PHYSICAL EXAM 
This was created with voice recognition software and transcription errors may be present. 10:20 AM 
Date: 5/10/2020 Patient Name: Reuben Tavares History of Presenting Illness Chief Complaint: 
 
History Provided By:  
 
HPI: Reuben Tavares is a 80 y.o. female past medical history of acute MI 1996 arthritis asthma cancer hypertension and hypothyroidism who presents for sudden onset of change in mental status. Patient who is baseline status is unknown was reportedly sitting in a chair at the table this morning she became suddenly unresponsive with sonorous respirations and EMS was called they found her hyperventilating and administered bag-valve-mask ventilation. She was confused and was brought to the emergency department. She was seen upon arrival she is awake and mumbling something about Vandana Lutz. Overall mental status is unclear blue blood glucose was around 130. She is mildly bradycardic in the 40s and 50s blood pressure in the low 100s PCP: Amanda Jeffrey MD 
 
 
Past History Past Medical History: 
Past Medical History:  
Diagnosis Date  Acid reflux  Acute MI ([facility] [address]   
 1996  Arthritis  Asthma  Cancer ([facility] [address] breast  
 Hypertension  Hypothyroidism Past Surgical History: 
Past Surgical History:  
Procedure Laterality Date  HX HERNIA REPAIR    
 HX HYSTERECTOMY  HX KNEE REPLACEMENT    
 bilateral  
 HX LYMPHADENECTOMY    
 right arm  HX MASTECTOMY    
 bilateral  
 HX OTHER SURGICAL    
 lacrimal duct plugs  HX TONSIL AND ADENOIDECTOMY Family History: No family history on file. Social History: 
Social History Tobacco Use  Smoking status: Never Smoker Substance Use Topics  Alcohol use: No  
 Drug use: No  
 
 
Allergies: Allergies Allergen Reactions  Latex Rash LATEX TAPE  Olegario-1 Other (comments)  
  novacaine- makes me go crazy in the head  Morphine Review of Systems Review of Systems Unable to perform ROS: Mental status change 10 point review of systems otherwise negative unless noted in HPI. Physical Exam  
 
 
Physical Exam 
Constitutional:   
   Comments: Appears stated age. She has some bruising to both breasts she has a bruise to the anterior part of her neck. She is awake but confused HENT:  
   Nose: Nose normal.  
   Mouth/Throat:  
   Mouth: Mucous membranes are moist.  
Eyes:  
   Pupils: Pupils are equal, round, and reactive to light. Neck: Musculoskeletal: Normal range of motion. Cardiovascular:  
   Rate and Rhythm: Regular rhythm. Bradycardia present. Pulses: Normal pulses. Abdominal:  
   General: Abdomen is flat. Musculoskeletal: Normal range of motion. Skin: 
   General: Skin is warm. Comments: Bruising to both forearms as well as her neck that do not appear new Neurological:  
   Comments: Limited, awake but confused no gross focal deficits Psychiatric:  
   Comments: Untestable Diagnostic Study Results Vital Signs EKG: EKG shows a junctional bradycardia 43 with a QRS of 168 QTC of 427 new compared to prior was atrial 
Labs: CBC unremarkable Mr. unremarkable TSH is slightly elevated but T4 is normal.  No changes to the Synthroid Imaging: CT head is negative Questionable minimal developing atelectasis or infiltrate in the right lung 
base. Recommend follow-up chest x-ray, preferably PA and lateral, when 
clinically feasible. Medical Decision Making ED Course: Progress Notes, Reevaluation, and Consults: 
 
 
Provider Notes (Medical Decision Making): This is a 42-year-old female who presents with sudden onset mental status differential is broad at this point. Does not appear to be very secondary to hypoglycemia as her glucose was in the 130 range.   Certainly could be a seizure she had some sonorous respirations followed by altered mental status that is starting to improve. She is awake but confused. No fever no chills no known recent infection. Do not know the patient's baseline mental status. She is becoming more conversant. She is able to state her name she is asking about her arm but she does not recognize her daughter. Her daughter is her medical power of  here with her sister trying to decide if they want lab work or further evaluation at this time. Rajeev Ordonez At this point does not want the patient to be admitted I think that is reasonable. She is hypotensive bradycardic. She has a history of A. fib and was initially taken off her metoprolol 25 mg twice daily but then she had some RVR so was placed on 50 mg twice daily which may be why she is in a slow A. fib right now the course is also may be sick sinus. They are interested in pursuing hospice and checking basic. Family is requesting hospice consult. We will continue with evaluation patient very well could have sick sinus syndrome we discussed pacemaker but they would prefer to go home with hospice at this point. Seems reasonable 3:04 PM d/w hospice who will eval pt. Family to cut the Lopressor dose to 25 twice daily; otherwise no changes. Home with hospice. Follow up pcp in am.  
 
BP HR improved. Pt at baseline. Refer to hospice. Diagnosis Clinical Impression: No diagnosis found. Disposition: 
 
Patient's Medications Start Taking No medications on file Continue Taking ACETAMINOPHEN (TYLENOL) 500 MG TABLET    Take 1,000 mg by mouth every six (6) hours as needed for Fever or Pain. Care giver reports 1 tab in the am  
 AMLODIPINE (NORVASC) 5 MG TABLET    Take 5 mg by mouth daily. Indications: hypertension ASPIRIN 81 MG TABLET    Take 81 mg by mouth daily. ASPIRIN DELAYED-RELEASE 81 MG TABLET    Take 81 mg by mouth daily. ATORVASTATIN (LIPITOR) 20 MG TABLET    Take 20 mg by mouth daily.   
 CALCIUM CARBONATE-VIT D3-MIN (CALTRATE 600+D PLUS MINERALS) 600 MG CALCIUM- 400 UNIT TAB    Take 1 Tab by mouth daily. CEPHALEXIN (KEFLEX) 500 MG CAPSULE    Take 500 mg by mouth two (2) times a day. 1 cap BID CYANOCOBALAMIN (VITAMIN B12) 100 MCG TABLET    Take 1,000 mcg by mouth daily. DIPHENHYDRAMINE-ACETAMINOPHEN (TYLENOL PM EXTRA STRENGTH)  MG TAB    Take 1 Tab by mouth nightly as needed for Fever, Other or Pain (SLEEP). Care giver reports gives 1 tab at bedtime ESOMEPRAZOLE (NEXIUM) 40 MG CAPSULE    Take 40 mg by mouth every other day. FERROUS SULFATE 325 MG (65 MG IRON) TABLET    Take 325 mg by mouth daily. LEVOTHYROXINE (SYNTHROID) 100 MCG TABLET    Take 100 mcg by mouth Daily (before breakfast). LIDOCAINE (LIDODERM) 5 %    Apply patch to the affected area for 12 hours a day and remove for 12 hours a day. LOPERAMIDE (IMODIUM A-D) 2 MG TABLET    Take 2 Tabs by mouth four (4) times daily as needed for Diarrhea. MAGNESIUM HYDROXIDE ([employer] MILK OF MAGNESIA) 400 MG/5 ML SUSPENSION    Take 30 mL by mouth daily. MELATONIN 10 MG TAB    Take 10 mg by mouth nightly. MELOXICAM (MOBIC) 7.5 MG TABLET    Take 7.5 mg by mouth every Monday, Wednesday, Friday. METOPROLOL SUCCINATE (TOPROL-XL) 50 MG XL TABLET    Take 1 Tab by mouth daily. METOPROLOL TARTRATE (LOPRESSOR) 50 MG TABLET    Take 50 mg by mouth two (2) times a day. MIRTAZAPINE (REMERON) 30 MG TABLET    Take 1 Tab by mouth nightly. MONTELUKAST (SINGULAIR) 10 MG TABLET    Take 10 mg by mouth daily. OMEPRAZOLE DELAYED RELEASE (PRILOSEC D/R) 20 MG TABLET    Take 20 mg by mouth daily. OXYBUTYNIN (DITROPAN) 5 MG TABLET    Take 5 mg by mouth daily. POLYETHYLENE GLYCOL (MIRALAX) 17 GRAM PACKET    Take 17 g by mouth daily as needed for Other (CONSTIPATION). PROPYLENE GLYCOL (SYSTANE COMPLETE OP)    Administer 1 Drop to both eyes daily. TOLTERODINE ER (DETROL-LA) 2 MG ER CAPSULE    Take 2 mg by mouth daily. TRAMADOL (ULTRAM) 50 MG TABLET    Take 50 mg by mouth two (2) times daily as needed for Pain. These Medications have changed No medications on file Stop Taking No medications on file

## 2020-05-10 NOTE — PROGRESS NOTES
responded to a Code S for  Venice Ashby, who is a 80 y.o.,female, The  provided the following Interventions: 
Provided crisis spiritual care and support. Offered prayers on behalf for the patient. Chart reviewed. The following outcomes were achieved: 
 
 
Assessment: 
There are no further spiritual or Buddhist issues which require intervention at this time. Plan: 
Chaplains will continue to follow and will provide spiritual care as needed.  recommends bedside caregivers page  on duty if patient or family shows signs of acute spiritual or emotional distress. [facility] 
[phone]

## 2020-05-10 NOTE — ED NOTES
Pt transported to CT. DisorientedX4, garbled speech, calling out the name Asad. Family, POA and Advanced Directive at bedside with consent given for IV and labs. Tele-neuro at bedside.

## 2020-05-10 NOTE — ED TRIAGE NOTES
Pt arrived from home per EMS Altered mental status Fall Unresponsive with agonal breathing EMS bagged Slurred, garbled speech Alert to self only Family reports baseline A&O x4, clear speech with Hx of dementia Hx of fall 2 weeks ago and hit head. Did not receive treatment with head hematoma, right flank hematoma

## 2020-05-21 PROBLEM — Z51.5 HOSPICE CARE PATIENT: Status: ACTIVE | Noted: 2020-05-21
